# Patient Record
Sex: FEMALE | Race: WHITE | NOT HISPANIC OR LATINO | Employment: OTHER | URBAN - NONMETROPOLITAN AREA
[De-identification: names, ages, dates, MRNs, and addresses within clinical notes are randomized per-mention and may not be internally consistent; named-entity substitution may affect disease eponyms.]

---

## 2017-01-09 RX ORDER — PRAVASTATIN SODIUM 80 MG/1
TABLET ORAL
Qty: 90 TABLET | Refills: 0 | Status: SHIPPED | OUTPATIENT
Start: 2017-01-09 | End: 2017-03-09 | Stop reason: SDUPTHER

## 2017-03-09 RX ORDER — PRAVASTATIN SODIUM 80 MG/1
TABLET ORAL
Qty: 90 TABLET | Refills: 0 | Status: SHIPPED | OUTPATIENT
Start: 2017-03-09 | End: 2017-05-17 | Stop reason: SDUPTHER

## 2017-03-28 ENCOUNTER — APPOINTMENT (OUTPATIENT)
Dept: ULTRASOUND IMAGING | Facility: HOSPITAL | Age: 77
End: 2017-03-28

## 2017-03-28 ENCOUNTER — HOSPITAL ENCOUNTER (EMERGENCY)
Facility: HOSPITAL | Age: 77
Discharge: HOME | End: 2017-03-28
Attending: FAMILY MEDICINE | Admitting: FAMILY MEDICINE

## 2017-03-28 ENCOUNTER — APPOINTMENT (OUTPATIENT)
Dept: GENERAL RADIOLOGY | Facility: HOSPITAL | Age: 77
End: 2017-03-28

## 2017-03-28 VITALS
TEMPERATURE: 97.7 F | HEIGHT: 64 IN | OXYGEN SATURATION: 97 % | DIASTOLIC BLOOD PRESSURE: 72 MMHG | SYSTOLIC BLOOD PRESSURE: 153 MMHG | BODY MASS INDEX: 27.14 KG/M2 | WEIGHT: 159 LBS | HEART RATE: 58 BPM | RESPIRATION RATE: 20 BRPM

## 2017-03-28 DIAGNOSIS — S76.011A STRAIN OF MUSCLE OF RIGHT HIP, INITIAL ENCOUNTER: Primary | ICD-10-CM

## 2017-03-28 DIAGNOSIS — I10 BENIGN ESSENTIAL HYPERTENSION: ICD-10-CM

## 2017-03-28 LAB
ANION GAP SERPL CALCULATED.3IONS-SCNC: 9.4 MMOL/L
APTT PPP: 29.1 SECONDS (ref 25–36)
BASOPHILS # BLD AUTO: 0.05 10*3/MM3 (ref 0–0.2)
BASOPHILS NFR BLD AUTO: 0.8 % (ref 0–2.5)
BUN BLD-MCNC: 21 MG/DL (ref 7–20)
BUN/CREAT SERPL: 16.2 (ref 7.1–23.5)
CALCIUM SPEC-SCNC: 10.8 MG/DL (ref 8.4–10.2)
CHLORIDE SERPL-SCNC: 100 MMOL/L (ref 98–107)
CO2 SERPL-SCNC: 37 MMOL/L (ref 26–30)
CREAT BLD-MCNC: 1.3 MG/DL (ref 0.6–1.3)
DEPRECATED RDW RBC AUTO: 43.9 FL (ref 37–54)
EOSINOPHIL # BLD AUTO: 0.42 10*3/MM3 (ref 0–0.7)
EOSINOPHIL NFR BLD AUTO: 6.3 % (ref 0–7)
ERYTHROCYTE [DISTWIDTH] IN BLOOD BY AUTOMATED COUNT: 12.7 % (ref 11.5–14.5)
GFR SERPL CREATININE-BSD FRML MDRD: 40 ML/MIN/1.73
GLUCOSE BLD-MCNC: 90 MG/DL (ref 74–98)
HCT VFR BLD AUTO: 43.3 % (ref 37–47)
HGB BLD-MCNC: 14.6 G/DL (ref 12–16)
IMM GRANULOCYTES # BLD: 0.02 10*3/MM3 (ref 0–0.06)
IMM GRANULOCYTES NFR BLD: 0.3 % (ref 0–0.6)
INR PPP: 1.09 (ref 0.9–1.1)
LYMPHOCYTES # BLD AUTO: 3.16 10*3/MM3 (ref 0.6–3.4)
LYMPHOCYTES NFR BLD AUTO: 47.6 % (ref 10–50)
MCH RBC QN AUTO: 31.8 PG (ref 27–31)
MCHC RBC AUTO-ENTMCNC: 33.7 G/DL (ref 30–37)
MCV RBC AUTO: 94.3 FL (ref 81–99)
MONOCYTES # BLD AUTO: 0.58 10*3/MM3 (ref 0–0.9)
MONOCYTES NFR BLD AUTO: 8.7 % (ref 0–12)
NEUTROPHILS # BLD AUTO: 2.41 10*3/MM3 (ref 2–6.9)
NEUTROPHILS NFR BLD AUTO: 36.3 % (ref 37–80)
NRBC BLD MANUAL-RTO: 0 /100 WBC (ref 0–0)
PLATELET # BLD AUTO: 214 10*3/MM3 (ref 130–400)
PMV BLD AUTO: 9.3 FL (ref 6–12)
POTASSIUM BLD-SCNC: 3.4 MMOL/L (ref 3.5–5.1)
PROTHROMBIN TIME: 11.9 SECONDS (ref 9.3–12.1)
RBC # BLD AUTO: 4.59 10*6/MM3 (ref 4.2–5.4)
SODIUM BLD-SCNC: 143 MMOL/L (ref 137–145)
WBC NRBC COR # BLD: 6.64 10*3/MM3 (ref 4.8–10.8)

## 2017-03-28 PROCEDURE — 93971 EXTREMITY STUDY: CPT

## 2017-03-28 PROCEDURE — 93926 LOWER EXTREMITY STUDY: CPT

## 2017-03-28 PROCEDURE — 72170 X-RAY EXAM OF PELVIS: CPT

## 2017-03-28 PROCEDURE — 73562 X-RAY EXAM OF KNEE 3: CPT

## 2017-03-28 PROCEDURE — 85025 COMPLETE CBC W/AUTO DIFF WBC: CPT | Performed by: FAMILY MEDICINE

## 2017-03-28 PROCEDURE — 85610 PROTHROMBIN TIME: CPT | Performed by: FAMILY MEDICINE

## 2017-03-28 PROCEDURE — 99283 EMERGENCY DEPT VISIT LOW MDM: CPT

## 2017-03-28 PROCEDURE — 80048 BASIC METABOLIC PNL TOTAL CA: CPT | Performed by: FAMILY MEDICINE

## 2017-03-28 PROCEDURE — 85730 THROMBOPLASTIN TIME PARTIAL: CPT | Performed by: FAMILY MEDICINE

## 2017-03-28 RX ORDER — SODIUM CHLORIDE 0.9 % (FLUSH) 0.9 %
10 SYRINGE (ML) INJECTION AS NEEDED
Status: DISCONTINUED | OUTPATIENT
Start: 2017-03-28 | End: 2017-03-29 | Stop reason: HOSPADM

## 2017-03-28 RX ORDER — TRAMADOL HYDROCHLORIDE 50 MG/1
50 TABLET ORAL 2 TIMES DAILY PRN
Qty: 6 TABLET | Refills: 0 | Status: SHIPPED | OUTPATIENT
Start: 2017-03-28 | End: 2017-12-01

## 2017-03-29 LAB — HOLD SPECIMEN: NORMAL

## 2017-03-31 ENCOUNTER — OFFICE VISIT (OUTPATIENT)
Dept: INTERNAL MEDICINE | Facility: CLINIC | Age: 77
End: 2017-03-31

## 2017-03-31 VITALS
HEART RATE: 67 BPM | BODY MASS INDEX: 27.49 KG/M2 | HEIGHT: 64 IN | SYSTOLIC BLOOD PRESSURE: 128 MMHG | OXYGEN SATURATION: 96 % | WEIGHT: 161 LBS | DIASTOLIC BLOOD PRESSURE: 62 MMHG | TEMPERATURE: 98 F

## 2017-03-31 DIAGNOSIS — E03.8 OTHER SPECIFIED HYPOTHYROIDISM: ICD-10-CM

## 2017-03-31 DIAGNOSIS — E78.5 HYPERLIPIDEMIA, UNSPECIFIED HYPERLIPIDEMIA TYPE: ICD-10-CM

## 2017-03-31 DIAGNOSIS — K21.9 GASTROESOPHAGEAL REFLUX DISEASE WITHOUT ESOPHAGITIS: ICD-10-CM

## 2017-03-31 DIAGNOSIS — I10 BENIGN ESSENTIAL HYPERTENSION: Primary | ICD-10-CM

## 2017-03-31 PROCEDURE — 99213 OFFICE O/P EST LOW 20 MIN: CPT | Performed by: INTERNAL MEDICINE

## 2017-03-31 RX ORDER — LOSARTAN POTASSIUM 50 MG/1
50 TABLET ORAL DAILY
Qty: 90 TABLET | Refills: 1 | Status: SHIPPED | OUTPATIENT
Start: 2017-03-31 | End: 2017-10-10 | Stop reason: SDUPTHER

## 2017-03-31 RX ORDER — HYDROCHLOROTHIAZIDE 12.5 MG/1
CAPSULE, GELATIN COATED ORAL
Qty: 180 CAPSULE | Refills: 1 | Status: SHIPPED | OUTPATIENT
Start: 2017-03-31 | End: 2017-10-10 | Stop reason: SDUPTHER

## 2017-03-31 RX ORDER — LEVOTHYROXINE SODIUM 0.1 MG/1
100 TABLET ORAL DAILY
Qty: 90 TABLET | Refills: 1 | Status: SHIPPED | OUTPATIENT
Start: 2017-03-31 | End: 2017-05-02 | Stop reason: SDUPTHER

## 2017-03-31 NOTE — PROGRESS NOTES
"Chief Complaint   Patient presents with   • Follow-up     6 months for HTN, HLD, and GERD. Pt states her right leg has a strain muscule.      Subjective   Kimberley Coe is a 77 y.o. female.     HPI Comments: Here for follow up of HTN, HLD, hypothyroid, GERD, vitD def, allergies.  Denies CP, SOB, some occasional edema. Occasional SOB with exertion.   She has an inhaler but rarely uses this.   Does not eat a healthy diet, eats a lot of sweets and fried foods.  Allergies doing well overall with flonase.   GERD is well controlled.  Takes her thyroid med at night on empty stomach.   Takes pravachol in PM.   She was recently cleaning her flower bed and pulled her right thigh muscle.  It is starting to feel better.   She has been applying heat and ice alternating.        The following portions of the patient's history were reviewed and updated as appropriate: allergies, current medications, past family history, past medical history, past social history, past surgical history and problem list.    Review of Systems   Respiratory: Positive for shortness of breath.    Cardiovascular: Positive for leg swelling. Negative for chest pain and palpitations.   Gastrointestinal: Negative for constipation.   Endocrine: Negative for cold intolerance and heat intolerance.   Musculoskeletal: Positive for myalgias.   Allergic/Immunologic: Positive for environmental allergies.   All other systems reviewed and are negative.      Objective   /62  Pulse 67  Temp 98 °F (36.7 °C)  Ht 64\" (162.6 cm)  Wt 161 lb (73 kg)  SpO2 96%  BMI 27.64 kg/m2  Body mass index is 27.64 kg/(m^2).  Physical Exam   Constitutional: She is oriented to person, place, and time. She appears well-developed and well-nourished.   HENT:   Head: Normocephalic and atraumatic.   Mouth/Throat: Oropharynx is clear and moist.   Eyes: Conjunctivae and EOM are normal. Pupils are equal, round, and reactive to light.   Neck: Normal range of motion. Neck supple. No " thyromegaly present.   Cardiovascular: Normal rate, regular rhythm and intact distal pulses.    Systolic murmur grade 2/6 heard over precordium   Pulmonary/Chest: Effort normal and breath sounds normal. No respiratory distress.   Abdominal: Soft. Bowel sounds are normal.   Musculoskeletal: She exhibits no edema.   Pain over right medial thigh with palpation   Lymphadenopathy:     She has no cervical adenopathy.   Neurological: She is alert and oriented to person, place, and time. No cranial nerve deficit.   Psychiatric: She has a normal mood and affect. Judgment normal.   Nursing note and vitals reviewed.      Assessment/Plan   Kimberley Coe is here today and the following problems have been addressed:      Kimberley was seen today for follow-up.    Diagnoses and all orders for this visit:    Benign essential hypertension    Hyperlipidemia, unspecified hyperlipidemia type    Gastroesophageal reflux disease without esophagitis    Other specified hypothyroidism    Other orders  -     levothyroxine (SYNTHROID, LEVOTHROID) 100 MCG tablet; Take 1 tablet by mouth Daily.  -     losartan (COZAAR) 50 MG tablet; Take 1 tablet by mouth Daily.  -     hydrochlorothiazide (MICROZIDE) 12.5 MG capsule; Take 1-2 capsules daily for blood pressure    Follow heart healthy/low cholesterol diet  Avoid processed/fried foods/fast food  Exercise as tolerated up to 30 minutes 5 days a week  Take all medications as prescribed  Check BP on occasion  Recommend foods high in potassium once a day as discussed due to low K on labs in ER  Labs as noted in one month to followup abnormal ER labs     RTC 4 mo  Please note that portions of this note were completed with a voice recognition program.  Efforts were made to edit dictation, but occasionally words are mistranscribed.

## 2017-04-03 ENCOUNTER — TRANSCRIBE ORDERS (OUTPATIENT)
Dept: MAMMOGRAPHY | Facility: HOSPITAL | Age: 77
End: 2017-04-03

## 2017-04-03 DIAGNOSIS — Z13.9 SCREENING: Primary | ICD-10-CM

## 2017-04-17 ENCOUNTER — HOSPITAL ENCOUNTER (OUTPATIENT)
Dept: MAMMOGRAPHY | Facility: HOSPITAL | Age: 77
Discharge: HOME | End: 2017-04-17
Admitting: INTERNAL MEDICINE

## 2017-04-17 DIAGNOSIS — Z13.9 SCREENING: ICD-10-CM

## 2017-04-17 PROCEDURE — G0202 SCR MAMMO BI INCL CAD: HCPCS

## 2017-04-17 PROCEDURE — 77063 BREAST TOMOSYNTHESIS BI: CPT

## 2017-04-28 ENCOUNTER — RESULTS ENCOUNTER (OUTPATIENT)
Dept: INTERNAL MEDICINE | Facility: CLINIC | Age: 77
End: 2017-04-28

## 2017-04-28 DIAGNOSIS — I10 BENIGN ESSENTIAL HYPERTENSION: ICD-10-CM

## 2017-04-28 DIAGNOSIS — E03.8 OTHER SPECIFIED HYPOTHYROIDISM: ICD-10-CM

## 2017-04-28 DIAGNOSIS — E78.5 HYPERLIPIDEMIA, UNSPECIFIED HYPERLIPIDEMIA TYPE: ICD-10-CM

## 2017-04-28 LAB
ALBUMIN SERPL-MCNC: 3.8 G/DL (ref 3.5–5)
ALBUMIN/GLOB SERPL: 1.4 G/DL (ref 1–2)
ALP SERPL-CCNC: 46 U/L (ref 38–126)
ALT SERPL-CCNC: 27 U/L (ref 13–69)
AST SERPL-CCNC: 21 U/L (ref 15–46)
BASOPHILS # BLD AUTO: 0.06 10*3/MM3 (ref 0–0.2)
BASOPHILS NFR BLD AUTO: 1 % (ref 0–2.5)
BILIRUB SERPL-MCNC: 0.8 MG/DL (ref 0.2–1.3)
BUN SERPL-MCNC: 20 MG/DL (ref 7–20)
BUN/CREAT SERPL: 18.2 (ref 7.1–23.5)
CALCIUM SERPL-MCNC: 10.2 MG/DL (ref 8.4–10.2)
CHLORIDE SERPL-SCNC: 98 MMOL/L (ref 98–107)
CHOLEST SERPL-MCNC: 145 MG/DL (ref 0–199)
CO2 SERPL-SCNC: 30 MMOL/L (ref 26–30)
CREAT SERPL-MCNC: 1.1 MG/DL (ref 0.6–1.3)
EOSINOPHIL # BLD AUTO: 0.36 10*3/MM3 (ref 0–0.7)
EOSINOPHIL NFR BLD AUTO: 6.1 % (ref 0–7)
ERYTHROCYTE [DISTWIDTH] IN BLOOD BY AUTOMATED COUNT: 12.9 % (ref 11.5–14.5)
GLOBULIN SER CALC-MCNC: 2.8 GM/DL
GLUCOSE SERPL-MCNC: 84 MG/DL (ref 74–98)
HCT VFR BLD AUTO: 40.2 % (ref 37–47)
HDLC SERPL-MCNC: 55 MG/DL (ref 40–60)
HGB BLD-MCNC: 13.1 G/DL (ref 12–16)
IMM GRANULOCYTES # BLD: 0.02 10*3/MM3 (ref 0–0.06)
IMM GRANULOCYTES NFR BLD: 0.3 % (ref 0–0.6)
LDLC SERPL CALC-MCNC: 66 MG/DL (ref 0–99)
LYMPHOCYTES # BLD AUTO: 2.35 10*3/MM3 (ref 0.6–3.4)
LYMPHOCYTES NFR BLD AUTO: 39.7 % (ref 10–50)
MCH RBC QN AUTO: 31.6 PG (ref 27–31)
MCHC RBC AUTO-ENTMCNC: 32.6 G/DL (ref 30–37)
MCV RBC AUTO: 96.9 FL (ref 81–99)
MONOCYTES # BLD AUTO: 0.68 10*3/MM3 (ref 0–0.9)
MONOCYTES NFR BLD AUTO: 11.5 % (ref 0–12)
NEUTROPHILS # BLD AUTO: 2.45 10*3/MM3 (ref 2–6.9)
NEUTROPHILS NFR BLD AUTO: 41.4 % (ref 37–80)
NRBC BLD AUTO-RTO: 0 /100 WBC (ref 0–0)
PLATELET # BLD AUTO: 235 10*3/MM3 (ref 130–400)
POTASSIUM SERPL-SCNC: 4.1 MMOL/L (ref 3.5–5.1)
PROT SERPL-MCNC: 6.6 G/DL (ref 6.3–8.2)
RBC # BLD AUTO: 4.15 10*6/MM3 (ref 4.2–5.4)
SODIUM SERPL-SCNC: 139 MMOL/L (ref 137–145)
T4 FREE SERPL-MCNC: 1.6 NG/DL (ref 0.78–2.19)
TRIGL SERPL-MCNC: 122 MG/DL
TSH SERPL DL<=0.005 MIU/L-ACNC: 0.98 MIU/ML (ref 0.47–4.68)
VLDLC SERPL CALC-MCNC: 24.4 MG/DL
WBC # BLD AUTO: 5.92 10*3/MM3 (ref 4.8–10.8)

## 2017-05-02 ENCOUNTER — TELEPHONE (OUTPATIENT)
Dept: INTERNAL MEDICINE | Facility: CLINIC | Age: 77
End: 2017-05-02

## 2017-05-02 RX ORDER — LEVOTHYROXINE SODIUM 0.1 MG/1
100 TABLET ORAL DAILY
Qty: 90 TABLET | Refills: 3 | Status: SHIPPED | OUTPATIENT
Start: 2017-05-02 | End: 2018-06-11 | Stop reason: SDUPTHER

## 2017-05-17 RX ORDER — PRAVASTATIN SODIUM 80 MG/1
TABLET ORAL
Qty: 90 TABLET | Refills: 1 | Status: SHIPPED | OUTPATIENT
Start: 2017-05-17 | End: 2017-12-13 | Stop reason: SDUPTHER

## 2017-07-13 ENCOUNTER — TELEPHONE (OUTPATIENT)
Dept: INTERNAL MEDICINE | Facility: CLINIC | Age: 77
End: 2017-07-13

## 2017-07-13 DIAGNOSIS — Z01.00 ROUTINE EYE EXAM: Primary | ICD-10-CM

## 2017-07-13 NOTE — TELEPHONE ENCOUNTER
Patient needs a referral to Opthalmology.    She said her appt is with Dr. Rodrigo Dumont  Fax: [phone]  Phone:[phone]    She'd like a callback.

## 2017-08-01 ENCOUNTER — OFFICE VISIT (OUTPATIENT)
Dept: INTERNAL MEDICINE | Facility: CLINIC | Age: 77
End: 2017-08-01

## 2017-08-01 VITALS
WEIGHT: 157 LBS | TEMPERATURE: 97.5 F | DIASTOLIC BLOOD PRESSURE: 64 MMHG | OXYGEN SATURATION: 95 % | BODY MASS INDEX: 26.8 KG/M2 | HEIGHT: 64 IN | SYSTOLIC BLOOD PRESSURE: 126 MMHG | HEART RATE: 56 BPM

## 2017-08-01 DIAGNOSIS — K21.9 GASTROESOPHAGEAL REFLUX DISEASE WITHOUT ESOPHAGITIS: ICD-10-CM

## 2017-08-01 DIAGNOSIS — E83.52 HYPERCALCEMIA: ICD-10-CM

## 2017-08-01 DIAGNOSIS — I10 BENIGN ESSENTIAL HYPERTENSION: Primary | ICD-10-CM

## 2017-08-01 DIAGNOSIS — E78.2 MIXED HYPERLIPIDEMIA: ICD-10-CM

## 2017-08-01 PROCEDURE — 99213 OFFICE O/P EST LOW 20 MIN: CPT | Performed by: INTERNAL MEDICINE

## 2017-08-01 RX ORDER — ALBUTEROL SULFATE 90 UG/1
2 AEROSOL, METERED RESPIRATORY (INHALATION) EVERY 4 HOURS PRN
Qty: 18 G | Refills: 3 | Status: SHIPPED | OUTPATIENT
Start: 2017-08-01 | End: 2018-08-14 | Stop reason: SDUPTHER

## 2017-08-01 NOTE — PROGRESS NOTES
"Chief Complaint   Patient presents with   • Follow-up     4 months for HTN, HLD, and Hypothyroidism. No new complaints.      Subjective   Kimberley Coe is a 77 y.o. female.     HPI Comments: Here for follow up of HTN, HLD, hypothyroid, GERD, vitD def, allergies.  Denies CP, SOB, some occasional edema. Occasional SOB with exertion or with excessive heat. .   She has an inhaler but rarely uses this.  She may use this 1-2 times a week.   Does not eat a healthy diet, eats a lot of sweets and fried foods.  Has tried to eat more vegetables thru the summer.   Allergies doing well overall with flonase.   GERD is well controlled with use of omeprazole.  Takes her thyroid med at night on empty stomach.   Takes pravachol in PM.   She is not taking tramadol currently.    She is compliant with vitamin B12 and D.        The following portions of the patient's history were reviewed and updated as appropriate: allergies, current medications, past family history, past medical history, past social history, past surgical history and problem list.    Review of Systems   Respiratory: Positive for shortness of breath.    Cardiovascular: Negative for chest pain, palpitations and leg swelling.   Gastrointestinal: Negative.    Endocrine: Negative for cold intolerance and heat intolerance.   Allergic/Immunologic: Negative for environmental allergies.   All other systems reviewed and are negative.      Objective   /64  Pulse 56  Temp 97.5 °F (36.4 °C)  Ht 64\" (162.6 cm)  Wt 157 lb (71.2 kg)  SpO2 95%  BMI 26.95 kg/m2  Body mass index is 26.95 kg/(m^2).  Physical Exam   Constitutional: She is oriented to person, place, and time. She appears well-developed and well-nourished.   HENT:   Head: Normocephalic and atraumatic.   Mouth/Throat: Oropharynx is clear and moist.   Eyes: Conjunctivae and EOM are normal. Pupils are equal, round, and reactive to light.   Neck: Normal range of motion. Neck supple. No thyromegaly present. "   Cardiovascular: Normal rate, regular rhythm and intact distal pulses.    Murmur heard.  SM 2/6 at USB   Pulmonary/Chest: Effort normal and breath sounds normal. No respiratory distress.   Abdominal: Soft. Bowel sounds are normal.   Musculoskeletal: She exhibits no edema.   Lymphadenopathy:     She has no cervical adenopathy.   Neurological: She is alert and oriented to person, place, and time. No cranial nerve deficit.   Psychiatric: She has a normal mood and affect. Judgment normal.   Nursing note and vitals reviewed.      Assessment/Plan   Kimberley Coe is here today and the following problems have been addressed:      Kimberley was seen today for follow-up.    Diagnoses and all orders for this visit:    Benign essential hypertension    Hypercalcemia    Mixed hyperlipidemia    Gastroesophageal reflux disease without esophagitis    Other orders  -     albuterol (PROVENTIL HFA;VENTOLIN HFA) 108 (90 BASE) MCG/ACT inhaler; Inhale 2 puffs Every 4 (Four) Hours As Needed for Wheezing.    Follow heart healthy/low salt diet  Avoid processed foods  Monitor blood pressure as discussed  Exercise as tolerated up to 30 minutes 5 days per week  Take all medications as prescribed  Encouraged more walking and better diet for cholesterol  RF given on albuterol  Labs are UTD    RTC 4 mo    Please note that portions of this note were completed with a voice recognition program.  Efforts were made to edit dictation, but occasionally words are mistranscribed.

## 2017-10-02 ENCOUNTER — PREP FOR SURGERY (OUTPATIENT)
Dept: OTHER | Facility: HOSPITAL | Age: 77
End: 2017-10-02

## 2017-10-02 DIAGNOSIS — H26.9 CATARACT, LEFT: Primary | ICD-10-CM

## 2017-10-02 RX ORDER — PHENYLEPHRINE HYDROCHLORIDE 100 MG/ML
1 SOLUTION/ DROPS OPHTHALMIC
Status: CANCELLED | OUTPATIENT
Start: 2017-10-02 | End: 2017-10-02

## 2017-10-02 RX ORDER — LIDOCAINE HYDROCHLORIDE 40 MG/ML
2 INJECTION, SOLUTION RETROBULBAR; TOPICAL
Status: CANCELLED | OUTPATIENT
Start: 2017-10-02

## 2017-10-02 RX ORDER — CYCLOPENTOLATE HYDROCHLORIDE 20 MG/ML
1 SOLUTION/ DROPS OPHTHALMIC
Status: CANCELLED | OUTPATIENT
Start: 2017-10-02 | End: 2017-10-02

## 2017-10-02 RX ORDER — TETRACAINE HYDROCHLORIDE 5 MG/ML
2 SOLUTION OPHTHALMIC AS NEEDED
Status: CANCELLED | OUTPATIENT
Start: 2017-10-02

## 2017-10-02 RX ORDER — MOXIFLOXACIN 5 MG/ML
1 SOLUTION/ DROPS OPHTHALMIC
Status: CANCELLED | OUTPATIENT
Start: 2017-10-02 | End: 2017-10-02

## 2017-10-02 RX ORDER — PREDNISOLONE ACETATE 10 MG/ML
1 SUSPENSION/ DROPS OPHTHALMIC 4 TIMES DAILY
Status: CANCELLED | OUTPATIENT
Start: 2017-10-02

## 2017-10-05 ENCOUNTER — ANESTHESIA EVENT (OUTPATIENT)
Dept: PERIOP | Facility: HOSPITAL | Age: 77
End: 2017-10-05

## 2017-10-05 ENCOUNTER — HOSPITAL ENCOUNTER (OUTPATIENT)
Facility: HOSPITAL | Age: 77
Setting detail: HOSPITAL OUTPATIENT SURGERY
Discharge: HOME | End: 2017-10-05
Attending: OPHTHALMOLOGY | Admitting: OPHTHALMOLOGY

## 2017-10-05 ENCOUNTER — ANESTHESIA (OUTPATIENT)
Dept: PERIOP | Facility: HOSPITAL | Age: 77
End: 2017-10-05

## 2017-10-05 VITALS
BODY MASS INDEX: 27.82 KG/M2 | RESPIRATION RATE: 18 BRPM | DIASTOLIC BLOOD PRESSURE: 56 MMHG | HEIGHT: 63 IN | OXYGEN SATURATION: 99 % | WEIGHT: 157 LBS | SYSTOLIC BLOOD PRESSURE: 165 MMHG | TEMPERATURE: 97.7 F | HEART RATE: 63 BPM

## 2017-10-05 DIAGNOSIS — H26.9 CATARACT, LEFT: ICD-10-CM

## 2017-10-05 PROCEDURE — V2632 POST CHMBR INTRAOCULAR LENS: HCPCS | Performed by: OPHTHALMOLOGY

## 2017-10-05 PROCEDURE — 25010000002 EPINEPHRINE 1 MG/ML SOLUTION: Performed by: OPHTHALMOLOGY

## 2017-10-05 PROCEDURE — 25010000002 MIDAZOLAM PER 1 MG: Performed by: NURSE ANESTHETIST, CERTIFIED REGISTERED

## 2017-10-05 PROCEDURE — 25010000002 EPINEPHRINE PER 0.1 MG: Performed by: OPHTHALMOLOGY

## 2017-10-05 DEVICE — LENS ACRYSOF IQ 6X13MM SN60WF 21.5: Type: IMPLANTABLE DEVICE | Site: POSTERIOR CHAMBER | Status: FUNCTIONAL

## 2017-10-05 RX ORDER — POLYMYXIN B SULFATE AND TRIMETHOPRIM 1; 10000 MG/ML; [USP'U]/ML
SOLUTION OPHTHALMIC AS NEEDED
Status: DISCONTINUED | OUTPATIENT
Start: 2017-10-05 | End: 2017-10-05 | Stop reason: HOSPADM

## 2017-10-05 RX ORDER — BALANCED SALT SOLUTION 6.4; .75; .48; .3; 3.9; 1.7 MG/ML; MG/ML; MG/ML; MG/ML; MG/ML; MG/ML
SOLUTION OPHTHALMIC AS NEEDED
Status: DISCONTINUED | OUTPATIENT
Start: 2017-10-05 | End: 2017-10-05 | Stop reason: HOSPADM

## 2017-10-05 RX ORDER — PREDNISOLONE ACETATE 10 MG/ML
SUSPENSION/ DROPS OPHTHALMIC
Status: DISCONTINUED
Start: 2017-10-05 | End: 2017-10-05 | Stop reason: HOSPADM

## 2017-10-05 RX ORDER — PHENYLEPHRINE HYDROCHLORIDE 100 MG/ML
1 SOLUTION/ DROPS OPHTHALMIC
Status: COMPLETED | OUTPATIENT
Start: 2017-10-05 | End: 2017-10-05

## 2017-10-05 RX ORDER — EPINEPHRINE 1 MG/ML
INJECTION INTRAMUSCULAR; INTRAVENOUS; SUBCUTANEOUS AS NEEDED
Status: DISCONTINUED | OUTPATIENT
Start: 2017-10-05 | End: 2017-10-05 | Stop reason: HOSPADM

## 2017-10-05 RX ORDER — SODIUM CHLORIDE, SODIUM LACTATE, POTASSIUM CHLORIDE, CALCIUM CHLORIDE 600; 310; 30; 20 MG/100ML; MG/100ML; MG/100ML; MG/100ML
1000 INJECTION, SOLUTION INTRAVENOUS CONTINUOUS PRN
Status: DISCONTINUED | OUTPATIENT
Start: 2017-10-05 | End: 2017-10-05 | Stop reason: HOSPADM

## 2017-10-05 RX ORDER — CYCLOPENTOLATE HYDROCHLORIDE 20 MG/ML
1 SOLUTION/ DROPS OPHTHALMIC
Status: COMPLETED | OUTPATIENT
Start: 2017-10-05 | End: 2017-10-05

## 2017-10-05 RX ORDER — MIDAZOLAM HYDROCHLORIDE 1 MG/ML
INJECTION INTRAMUSCULAR; INTRAVENOUS AS NEEDED
Status: DISCONTINUED | OUTPATIENT
Start: 2017-10-05 | End: 2017-10-05 | Stop reason: SURG

## 2017-10-05 RX ORDER — PHENYLEPHRINE HYDROCHLORIDE 100 MG/ML
SOLUTION/ DROPS OPHTHALMIC
Status: COMPLETED
Start: 2017-10-05 | End: 2017-10-05

## 2017-10-05 RX ORDER — PREDNISOLONE ACETATE 10 MG/ML
1 SUSPENSION/ DROPS OPHTHALMIC 4 TIMES DAILY
Status: DISCONTINUED | OUTPATIENT
Start: 2017-10-05 | End: 2017-10-05 | Stop reason: HOSPADM

## 2017-10-05 RX ORDER — SODIUM CHLORIDE 0.9 % (FLUSH) 0.9 %
3 SYRINGE (ML) INJECTION AS NEEDED
Status: DISCONTINUED | OUTPATIENT
Start: 2017-10-05 | End: 2017-10-05 | Stop reason: HOSPADM

## 2017-10-05 RX ORDER — MOXIFLOXACIN 5 MG/ML
1 SOLUTION/ DROPS OPHTHALMIC
Status: ACTIVE | OUTPATIENT
Start: 2017-10-05 | End: 2017-10-05

## 2017-10-05 RX ORDER — TETRACAINE HYDROCHLORIDE 5 MG/ML
SOLUTION OPHTHALMIC AS NEEDED
Status: DISCONTINUED | OUTPATIENT
Start: 2017-10-05 | End: 2017-10-05 | Stop reason: HOSPADM

## 2017-10-05 RX ORDER — LIDOCAINE HYDROCHLORIDE 40 MG/ML
SOLUTION TOPICAL AS NEEDED
Status: DISCONTINUED | OUTPATIENT
Start: 2017-10-05 | End: 2017-10-05 | Stop reason: HOSPADM

## 2017-10-05 RX ORDER — CYCLOPENTOLATE HYDROCHLORIDE 20 MG/ML
SOLUTION/ DROPS OPHTHALMIC
Status: COMPLETED
Start: 2017-10-05 | End: 2017-10-05

## 2017-10-05 RX ADMIN — MIDAZOLAM HYDROCHLORIDE 0.5 MG: 1 INJECTION, SOLUTION INTRAMUSCULAR; INTRAVENOUS at 09:46

## 2017-10-05 RX ADMIN — SODIUM CHLORIDE, POTASSIUM CHLORIDE, SODIUM LACTATE AND CALCIUM CHLORIDE 1000 ML: 600; 310; 30; 20 INJECTION, SOLUTION INTRAVENOUS at 08:11

## 2017-10-05 RX ADMIN — PHENYLEPHRINE HYDROCHLORIDE 1 DROP: 100 SOLUTION/ DROPS OPHTHALMIC at 08:12

## 2017-10-05 RX ADMIN — PHENYLEPHRINE HYDROCHLORIDE 1 DROP: 100 SOLUTION/ DROPS OPHTHALMIC at 08:02

## 2017-10-05 RX ADMIN — CYCLOPENTOLATE HYDROCHLORIDE 1 DROP: 20 SOLUTION/ DROPS OPHTHALMIC at 08:07

## 2017-10-05 RX ADMIN — CYCLOPENTOLATE HYDROCHLORIDE 1 DROP: 20 SOLUTION/ DROPS OPHTHALMIC at 08:02

## 2017-10-05 RX ADMIN — CYCLOPENTOLATE HYDROCHLORIDE 1 DROP: 20 SOLUTION/ DROPS OPHTHALMIC at 08:12

## 2017-10-05 RX ADMIN — MIDAZOLAM HYDROCHLORIDE 0.5 MG: 1 INJECTION, SOLUTION INTRAMUSCULAR; INTRAVENOUS at 09:41

## 2017-10-05 RX ADMIN — PHENYLEPHRINE HYDROCHLORIDE 1 DROP: 100 SOLUTION/ DROPS OPHTHALMIC at 08:07

## 2017-10-05 NOTE — H&P
[id number]  Teena Coe  female  1940  Rodrigo Dumont MD  10/5/2017  PATIENT NAME: Teena Coe    [facility]  PREOPERATIVE PHYSICAL EXAMINATION    Temp:  [97.6 °F (36.4 °C)] 97.6 °F (36.4 °C)  Heart Rate:  [69] 69  Resp:  [18] 18  BP: (182)/(78) 182/78    Past Medical History:   Diagnosis Date   • Arthritis    • Cataracts, bilateral    • Disease of thyroid gland    • Emphysema of lung    • Full dentures    • GERD (gastroesophageal reflux disease)    • Heart murmur    • Hyperlipidemia    • Hypertension    • Wears glasses        Past Surgical History:   Procedure Laterality Date   • COLONOSCOPY     • MULTIPLE TOOTH EXTRACTIONS         Social History     Social History   • Marital status:      Spouse name: N/A   • Number of children: N/A   • Years of education: N/A     Occupational History   • Not on file.     Social History Main Topics   • Smoking status: Former Smoker   • Smokeless tobacco: Never Used      Comment: QUIT 30 YEARS AGO   • Alcohol use No   • Drug use: No   • Sexual activity: Defer     Other Topics Concern   • Not on file     Social History Narrative       Family History   Problem Relation Age of Onset   • Diabetes Brother    • Breast cancer Daughter        Prescriptions Prior to Admission   Medication Sig Dispense Refill Last Dose   • albuterol (PROVENTIL HFA;VENTOLIN HFA) 108 (90 BASE) MCG/ACT inhaler Inhale 2 puffs Every 4 (Four) Hours As Needed for Wheezing. 18 g 3 9/21/2017   • aspirin 81 MG tablet Take  by mouth Daily.   10/4/2017 at 2000   • Cholecalciferol (VITAMIN D PO) Take  by mouth Daily.   10/4/2017 at 0800   • Cyanocobalamin (VITAMIN B-12 CR PO) Take  by mouth Daily.   10/4/2017 at 0800   • fluticasone (FLONASE) 50 MCG/ACT nasal spray into each nostril Daily As Needed.   10/4/2017 at 2100   • hydrochlorothiazide (MICROZIDE) 12.5 MG capsule Take 1-2 capsules daily for blood pressure 180 capsule 1 10/4/2017 at 0800   • levothyroxine (SYNTHROID, LEVOTHROID) 100 MCG tablet  Take 1 tablet by mouth Daily. 90 tablet 3 10/4/2017 at 2000   • losartan (COZAAR) 50 MG tablet Take 1 tablet by mouth Daily. 90 tablet 1 10/5/2017 at 0600   • Multiple Vitamins-Minerals (CENTRUM ADULTS PO) Take  by mouth Daily.   10/4/2017 at 0800   • omeprazole (PriLOSEC) 20 MG capsule Take 20 mg by mouth Daily.   10/4/2017 at 0800   • pravastatin (PRAVACHOL) 80 MG tablet TAKE 1 TABLET EVERY DAY 90 tablet 1 10/4/2017 at 2000   • traMADol (ULTRAM) 50 MG tablet Take 1 tablet by mouth 2 (Two) Times a Day As Needed for Moderate Pain (4-6) or Severe Pain (7-10). 6 tablet 0 10/4/2017 at 2000        Within Normal Limits Abnormal   Mental Status [x]    []     Head, Neck, and Throat [x]   []     Chest/Lungs [x]   []     Cardiovascular [x]   []     Abdomen [x]   []     Extremities [x]   []     Neurologic [x]   []     Other       Diagnosis: Cataract      STATEMENT AS TO REASON OF PLANNED OPERATION:  [x]   H&P revealed no contraindication to planned surgery. (Check if correct).    [x]   Labs (if ordered) have been reviewed and revealed no contraindications to planned surgery. (Check if correct).    [x]   Patient has been advised to see her local medical doctor/family doctor for follow-up regarding systemic care and/or abnormal labs.  (Check if correct).    Rodrigo Dumont MD  10/5/2017

## 2017-10-05 NOTE — OP NOTE
"Patient Name: Teena Coe  Patient Number: [id number]    PRE-OPERATIVE DIAGNOSIS:  Cataract []  OD, [x]  OS  H25.1()    POST-OPERATIVE DIAGNOSIS:  Same    PROCEDURE:     CATARACT PHACO EXTRACTION WITH INTRAOCULAR LENS IMPLANT LEFT (Left)    Surgeon:      Rodrigo Dumont MD    Date of Operation:    10/5/2017    ANESTHESIA:   MAC  COMPLICATIONS:    None  SPECIMEN REMOVED:  Cataract  ESTIMATED BLOOD LOSS:  None    After identifying the patient and obtained fully-informed consent, preoperative drops placed as ordered.  Pre-operative \"time out\" performed.  Patient brought into the operating room and positioned.  Cardiac monitoring was instituted.  Anesthetic gtt to operative eye.      After a surgical scrub, the patient was prepped and draped in the standard ophthalmic fashion.  Lid speculum. Paracentesis. Viscoelastic. Keratome tunneled into anterior chamber.  Capsulorrhexis. Hydrodissection.    Phaco machine tested and found to be in good working order.  Two-handed phacoemulsification performed.     I/A to remove residual cortex.  Viscoelastic in capsular bag.  Intraocular lens placed in standard fashion and centered.  I/A to remove viscoelastic.  Wound hydrated, tested, and found to be watertight.      Lid speculum and drapes removed.  Antibiotic gtt. Eye shield.  Patient to recovery area.  Verbal and written discharge instructions given.  Follow-up appointment given.    Rodrigo Dumont MD      Immediate Post-Op Note  Date: 10/5/2017 10:01 AM      "

## 2017-10-05 NOTE — ANESTHESIA PREPROCEDURE EVALUATION
Anesthesia Evaluation     Patient summary reviewed and Nursing notes reviewed   no history of anesthetic complications:  NPO Solid Status: > 8 hours  NPO Liquid Status: > 8 hours     Airway   no difficulty expected  Dental      Pulmonary - normal exam   (+) a smoker Former, COPD,   Cardiovascular - normal exam    PT is on anticoagulation therapy  Patient on routine beta blocker and Beta blocker given within 24 hours of surgery    (+) hypertension, valvular problems/murmurs, CAD, PVD, hyperlipidemia      Neuro/Psych  GI/Hepatic/Renal/Endo    (+)  GERD, hypothyroidism,     Musculoskeletal     (+) arthralgias,   Abdominal  - normal exam   Substance History      OB/GYN          Other   (+) arthritis                                   Anesthesia Plan    ASA 3     MAC     Anesthetic plan and risks discussed with patient.

## 2017-10-05 NOTE — ANESTHESIA POSTPROCEDURE EVALUATION
Patient: Teena Coe    Procedure Summary     Date Anesthesia Start Anesthesia Stop Room / Location    10/05/17 0940   [facility]       Procedure Diagnosis Surgeon Provider    CATARACT PHACO EXTRACTION WITH INTRAOCULAR LENS IMPLANT LEFT (Left Eye) No diagnosis on file. MD Rebel Sherman CRNA          Anesthesia Type: MAC  Last vitals  BP       Temp        Pulse       Resp        SpO2          Post Anesthesia Care and Evaluation    Patient location during evaluation: PHASE II  Patient participation: complete - patient participated  Level of consciousness: awake  Pain score: 0  Pain management: adequate  Airway patency: patent  Anesthetic complications: No anesthetic complications  PONV Status: none  Cardiovascular status: acceptable  Respiratory status: acceptable  Hydration status: acceptable    Comments: vsss resp spont, reflexes intact, responsive, report given to pacu nurse

## 2017-10-05 NOTE — PLAN OF CARE
Problem: Cataract (Adult)  Goal: Signs and Symptoms of Listed Potential Problems Will be Absent or Manageable (Cataract)  Outcome: Ongoing (interventions implemented as appropriate)    10/05/17 0750   Cataract   Problems Assessed (Cataract) all   Problems Present (Cataract) visual disturbance

## 2017-10-11 RX ORDER — HYDROCHLOROTHIAZIDE 12.5 MG/1
CAPSULE, GELATIN COATED ORAL
Qty: 180 CAPSULE | Refills: 1 | Status: SHIPPED | OUTPATIENT
Start: 2017-10-11 | End: 2018-04-10 | Stop reason: SDUPTHER

## 2017-10-11 RX ORDER — LOSARTAN POTASSIUM 50 MG/1
TABLET ORAL
Qty: 90 TABLET | Refills: 1 | Status: SHIPPED | OUTPATIENT
Start: 2017-10-11 | End: 2018-04-10 | Stop reason: SDUPTHER

## 2017-10-16 ENCOUNTER — PREP FOR SURGERY (OUTPATIENT)
Dept: OTHER | Facility: HOSPITAL | Age: 77
End: 2017-10-16

## 2017-10-16 DIAGNOSIS — H26.9 CATARACT, RIGHT: Primary | ICD-10-CM

## 2017-10-16 RX ORDER — MOXIFLOXACIN 5 MG/ML
1 SOLUTION/ DROPS OPHTHALMIC
Status: CANCELLED | OUTPATIENT
Start: 2017-10-16 | End: 2017-10-16

## 2017-10-16 RX ORDER — TETRACAINE HYDROCHLORIDE 5 MG/ML
2 SOLUTION OPHTHALMIC AS NEEDED
Status: CANCELLED | OUTPATIENT
Start: 2017-10-16

## 2017-10-16 RX ORDER — PREDNISOLONE ACETATE 10 MG/ML
1 SUSPENSION/ DROPS OPHTHALMIC 4 TIMES DAILY
Status: CANCELLED | OUTPATIENT
Start: 2017-10-16

## 2017-10-16 RX ORDER — LIDOCAINE HYDROCHLORIDE 40 MG/ML
2 INJECTION, SOLUTION RETROBULBAR; TOPICAL
Status: CANCELLED | OUTPATIENT
Start: 2017-10-16

## 2017-10-16 RX ORDER — PHENYLEPHRINE HYDROCHLORIDE 100 MG/ML
1 SOLUTION/ DROPS OPHTHALMIC
Status: CANCELLED | OUTPATIENT
Start: 2017-10-16 | End: 2017-10-16

## 2017-10-16 RX ORDER — CYCLOPENTOLATE HYDROCHLORIDE 20 MG/ML
1 SOLUTION/ DROPS OPHTHALMIC
Status: CANCELLED | OUTPATIENT
Start: 2017-10-16 | End: 2017-10-16

## 2017-10-19 ENCOUNTER — HOSPITAL ENCOUNTER (OUTPATIENT)
Facility: HOSPITAL | Age: 77
Setting detail: HOSPITAL OUTPATIENT SURGERY
Discharge: HOME | End: 2017-10-19
Attending: OPHTHALMOLOGY | Admitting: OPHTHALMOLOGY

## 2017-10-19 ENCOUNTER — ANESTHESIA EVENT (OUTPATIENT)
Dept: PERIOP | Facility: HOSPITAL | Age: 77
End: 2017-10-19

## 2017-10-19 ENCOUNTER — ANESTHESIA (OUTPATIENT)
Dept: PERIOP | Facility: HOSPITAL | Age: 77
End: 2017-10-19

## 2017-10-19 VITALS
HEART RATE: 63 BPM | HEIGHT: 63 IN | DIASTOLIC BLOOD PRESSURE: 65 MMHG | SYSTOLIC BLOOD PRESSURE: 145 MMHG | OXYGEN SATURATION: 97 % | RESPIRATION RATE: 18 BRPM | TEMPERATURE: 98.6 F | WEIGHT: 156 LBS | BODY MASS INDEX: 27.64 KG/M2

## 2017-10-19 DIAGNOSIS — H26.9 CATARACT, RIGHT: ICD-10-CM

## 2017-10-19 PROCEDURE — 25010000002 EPINEPHRINE 1 MG/ML SOLUTION: Performed by: OPHTHALMOLOGY

## 2017-10-19 PROCEDURE — V2632 POST CHMBR INTRAOCULAR LENS: HCPCS | Performed by: OPHTHALMOLOGY

## 2017-10-19 PROCEDURE — 25010000002 EPINEPHRINE PER 0.1 MG: Performed by: OPHTHALMOLOGY

## 2017-10-19 PROCEDURE — 25010000002 MIDAZOLAM PER 1 MG: Performed by: NURSE ANESTHETIST, CERTIFIED REGISTERED

## 2017-10-19 DEVICE — LENS ACRYSOF IQ 6X13MM SN60WF 22.0: Type: IMPLANTABLE DEVICE | Site: POSTERIOR CHAMBER | Status: FUNCTIONAL

## 2017-10-19 RX ORDER — PHENYLEPHRINE HYDROCHLORIDE 100 MG/ML
SOLUTION/ DROPS OPHTHALMIC
Status: COMPLETED
Start: 2017-10-19 | End: 2017-10-19

## 2017-10-19 RX ORDER — LIDOCAINE HYDROCHLORIDE 40 MG/ML
SOLUTION TOPICAL AS NEEDED
Status: DISCONTINUED | OUTPATIENT
Start: 2017-10-19 | End: 2017-10-19 | Stop reason: HOSPADM

## 2017-10-19 RX ORDER — MIDAZOLAM HYDROCHLORIDE 1 MG/ML
INJECTION INTRAMUSCULAR; INTRAVENOUS AS NEEDED
Status: DISCONTINUED | OUTPATIENT
Start: 2017-10-19 | End: 2017-10-19 | Stop reason: SURG

## 2017-10-19 RX ORDER — SODIUM CHLORIDE 0.9 % (FLUSH) 0.9 %
3 SYRINGE (ML) INJECTION AS NEEDED
Status: DISCONTINUED | OUTPATIENT
Start: 2017-10-19 | End: 2017-10-19 | Stop reason: HOSPADM

## 2017-10-19 RX ORDER — PREDNISOLONE ACETATE 10 MG/ML
1 SUSPENSION/ DROPS OPHTHALMIC 4 TIMES DAILY
Status: DISCONTINUED | OUTPATIENT
Start: 2017-10-19 | End: 2017-10-19 | Stop reason: HOSPADM

## 2017-10-19 RX ORDER — PHENYLEPHRINE HYDROCHLORIDE 100 MG/ML
1 SOLUTION/ DROPS OPHTHALMIC
Status: COMPLETED | OUTPATIENT
Start: 2017-10-19 | End: 2017-10-19

## 2017-10-19 RX ORDER — LIDOCAINE HYDROCHLORIDE 40 MG/ML
2 INJECTION, SOLUTION RETROBULBAR; TOPICAL
Status: DISCONTINUED | OUTPATIENT
Start: 2017-10-19 | End: 2017-10-19 | Stop reason: HOSPADM

## 2017-10-19 RX ORDER — POLYMYXIN B SULFATE AND TRIMETHOPRIM 1; 10000 MG/ML; [USP'U]/ML
SOLUTION OPHTHALMIC AS NEEDED
Status: DISCONTINUED | OUTPATIENT
Start: 2017-10-19 | End: 2017-10-19 | Stop reason: HOSPADM

## 2017-10-19 RX ORDER — CYCLOPENTOLATE HYDROCHLORIDE 20 MG/ML
SOLUTION/ DROPS OPHTHALMIC
Status: COMPLETED
Start: 2017-10-19 | End: 2017-10-19

## 2017-10-19 RX ORDER — PREDNISOLONE ACETATE 10 MG/ML
SUSPENSION/ DROPS OPHTHALMIC
Status: DISCONTINUED
Start: 2017-10-19 | End: 2017-10-19 | Stop reason: HOSPADM

## 2017-10-19 RX ORDER — TETRACAINE HYDROCHLORIDE 5 MG/ML
2 SOLUTION OPHTHALMIC AS NEEDED
Status: DISCONTINUED | OUTPATIENT
Start: 2017-10-19 | End: 2017-10-19 | Stop reason: HOSPADM

## 2017-10-19 RX ORDER — SODIUM CHLORIDE, SODIUM LACTATE, POTASSIUM CHLORIDE, CALCIUM CHLORIDE 600; 310; 30; 20 MG/100ML; MG/100ML; MG/100ML; MG/100ML
1000 INJECTION, SOLUTION INTRAVENOUS CONTINUOUS PRN
Status: DISCONTINUED | OUTPATIENT
Start: 2017-10-19 | End: 2017-10-19 | Stop reason: HOSPADM

## 2017-10-19 RX ORDER — BALANCED SALT SOLUTION 6.4; .75; .48; .3; 3.9; 1.7 MG/ML; MG/ML; MG/ML; MG/ML; MG/ML; MG/ML
SOLUTION OPHTHALMIC AS NEEDED
Status: DISCONTINUED | OUTPATIENT
Start: 2017-10-19 | End: 2017-10-19 | Stop reason: HOSPADM

## 2017-10-19 RX ORDER — EPINEPHRINE 1 MG/ML
INJECTION INTRAMUSCULAR; INTRAVENOUS; SUBCUTANEOUS AS NEEDED
Status: DISCONTINUED | OUTPATIENT
Start: 2017-10-19 | End: 2017-10-19 | Stop reason: HOSPADM

## 2017-10-19 RX ORDER — CYCLOPENTOLATE HYDROCHLORIDE 20 MG/ML
1 SOLUTION/ DROPS OPHTHALMIC
Status: COMPLETED | OUTPATIENT
Start: 2017-10-19 | End: 2017-10-19

## 2017-10-19 RX ADMIN — PHENYLEPHRINE HYDROCHLORIDE 1 DROP: 100 SOLUTION/ DROPS OPHTHALMIC at 08:34

## 2017-10-19 RX ADMIN — CYCLOPENTOLATE HYDROCHLORIDE 1 DROP: 20 SOLUTION/ DROPS OPHTHALMIC at 08:34

## 2017-10-19 RX ADMIN — MIDAZOLAM HYDROCHLORIDE 0.5 MG: 1 INJECTION, SOLUTION INTRAMUSCULAR; INTRAVENOUS at 09:58

## 2017-10-19 RX ADMIN — PHENYLEPHRINE HYDROCHLORIDE 1 DROP: 100 SOLUTION/ DROPS OPHTHALMIC at 08:44

## 2017-10-19 RX ADMIN — MIDAZOLAM HYDROCHLORIDE 0.5 MG: 1 INJECTION, SOLUTION INTRAMUSCULAR; INTRAVENOUS at 10:04

## 2017-10-19 RX ADMIN — CYCLOPENTOLATE HYDROCHLORIDE 1 DROP: 20 SOLUTION/ DROPS OPHTHALMIC at 08:39

## 2017-10-19 RX ADMIN — CYCLOPENTOLATE HYDROCHLORIDE 1 DROP: 20 SOLUTION/ DROPS OPHTHALMIC at 08:44

## 2017-10-19 RX ADMIN — SODIUM CHLORIDE, POTASSIUM CHLORIDE, SODIUM LACTATE AND CALCIUM CHLORIDE 1000 ML: 600; 310; 30; 20 INJECTION, SOLUTION INTRAVENOUS at 08:46

## 2017-10-19 RX ADMIN — PHENYLEPHRINE HYDROCHLORIDE 1 DROP: 100 SOLUTION/ DROPS OPHTHALMIC at 08:39

## 2017-10-19 NOTE — PLAN OF CARE
Problem: Cataract (Adult)  Goal: Signs and Symptoms of Listed Potential Problems Will be Absent or Manageable (Cataract)  Outcome: Ongoing (interventions implemented as appropriate)    10/19/17 2278   Cataract   Problems Assessed (Cataract) all   Problems Present (Cataract) none

## 2017-10-19 NOTE — ANESTHESIA PREPROCEDURE EVALUATION
Anesthesia Evaluation     Patient summary reviewed and Nursing notes reviewed   no history of anesthetic complications:  NPO Solid Status: > 8 hours  NPO Liquid Status: > 8 hours     Airway   Mallampati: II  TM distance: >3 FB  Neck ROM: full  no difficulty expected  Dental - normal exam     Pulmonary - normal exam   (+) a smoker Former, COPD,   Cardiovascular - normal exam    PT is on anticoagulation therapy  Patient on routine beta blocker and Beta blocker given within 24 hours of surgery    (+) hypertension, valvular problems/murmurs, CAD, PVD, hyperlipidemia      Neuro/Psych  GI/Hepatic/Renal/Endo    (+)  GERD, hypothyroidism,     Musculoskeletal     (+) arthralgias,   Abdominal  - normal exam   Substance History      OB/GYN          Other   (+) arthritis                                       Anesthesia Plan    ASA 3     MAC   (Risks and benefits discussed including risk of aspiration, recall and dental damage. All patient questions answered. Will continue with POC.)  intravenous induction   Anesthetic plan and risks discussed with patient.

## 2017-10-19 NOTE — ANESTHESIA POSTPROCEDURE EVALUATION
Patient: Teena Coe    Procedure Summary     Date Anesthesia Start Anesthesia Stop Room / Location    10/19/17 0957 1018  [facility]       Procedure Diagnosis Surgeon Provider    CATARACT PHACO EXTRACTION WITH INTRAOCULAR LENS IMPLANT RIGHT (Right Eye) No diagnosis on file. MD Alice Sherman CRNA          Anesthesia Type: MAC  Last vitals  BP   141/63 (10/19/17 1025)   Temp   98.6 °F (37 °C) (10/19/17 1025)   Pulse   67 (10/19/17 1025)   Resp   20 (10/19/17 1025)     SpO2   98 % (10/19/17 1025)     Post Anesthesia Care and Evaluation    Patient location during evaluation: PHASE II  Patient participation: complete - patient participated  Level of consciousness: awake and alert  Pain score: 0  Pain management: satisfactory to patient  Airway patency: patent  Anesthetic complications: No anesthetic complications  PONV Status: none  Cardiovascular status: acceptable and stable  Respiratory status: acceptable  Hydration status: acceptable

## 2017-10-19 NOTE — H&P
[id number]  Teena Coe  female  1940  Rodrigo Dumont MD  10/19/2017  PATIENT NAME: Teena Coe    [facility]  PREOPERATIVE PHYSICAL EXAMINATION    Temp:  [97.8 °F (36.6 °C)] 97.8 °F (36.6 °C)  Heart Rate:  [64] 64  Resp:  [18] 18  BP: (154)/(66) 154/66    Past Medical History:   Diagnosis Date   • Arthritis    • Cataracts, bilateral    • Disease of thyroid gland    • Emphysema of lung    • Full dentures    • GERD (gastroesophageal reflux disease)    • Heart murmur    • Hyperlipidemia    • Hypertension    • Wears glasses        Past Surgical History:   Procedure Laterality Date   • CATARACT EXTRACTION W/ INTRAOCULAR LENS IMPLANT Left 10/5/2017    Procedure: CATARACT PHACO EXTRACTION WITH INTRAOCULAR LENS IMPLANT LEFT;  Surgeon: Rodrigo Dumont MD;  Location: [facility];  Service:    • COLONOSCOPY     • MULTIPLE TOOTH EXTRACTIONS         Social History     Social History   • Marital status:      Spouse name: N/A   • Number of children: N/A   • Years of education: N/A     Occupational History   • Not on file.     Social History Main Topics   • Smoking status: Former Smoker   • Smokeless tobacco: Never Used      Comment: QUIT 30 YEARS AGO   • Alcohol use No   • Drug use: No   • Sexual activity: Defer     Other Topics Concern   • Not on file     Social History Narrative       Family History   Problem Relation Age of Onset   • Diabetes Brother    • Breast cancer Daughter        Prescriptions Prior to Admission   Medication Sig Dispense Refill Last Dose   • albuterol (PROVENTIL HFA;VENTOLIN HFA) 108 (90 BASE) MCG/ACT inhaler Inhale 2 puffs Every 4 (Four) Hours As Needed for Wheezing. 18 g 3 Past Month at Unknown time   • aspirin 81 MG tablet Take  by mouth Daily.   10/18/2017 at 2130   • Cholecalciferol (VITAMIN D PO) Take  by mouth Daily.   10/18/2017 at 0800   • Cyanocobalamin (VITAMIN B-12 CR PO) Take  by mouth Daily.   10/18/2017 at 0800   • fluticasone (FLONASE) 50 MCG/ACT nasal spray into each nostril  Daily As Needed.   Past Month at Unknown time   • hydrochlorothiazide (MICROZIDE) 12.5 MG capsule TAKE 1 TO 2 CAPSULES DAILY FOR BLOOD PRESSURE 180 capsule 1 10/19/2017 at 0630   • levothyroxine (SYNTHROID, LEVOTHROID) 100 MCG tablet Take 1 tablet by mouth Daily. 90 tablet 3 10/18/2017 at 0700   • losartan (COZAAR) 50 MG tablet TAKE 1 TABLET EVERY DAY 90 tablet 1 10/18/2017 at 2130   • Multiple Vitamins-Minerals (CENTRUM ADULTS PO) Take  by mouth Daily.   10/18/2017 at 0800   • omeprazole (PriLOSEC) 20 MG capsule Take 20 mg by mouth Daily.   10/18/2017 at 0800   • pravastatin (PRAVACHOL) 80 MG tablet TAKE 1 TABLET EVERY DAY 90 tablet 1 10/18/2017 at 2100   • traMADol (ULTRAM) 50 MG tablet Take 1 tablet by mouth 2 (Two) Times a Day As Needed for Moderate Pain (4-6) or Severe Pain (7-10). 6 tablet 0 10/18/2017 at 2200        Within Normal Limits Abnormal   Mental Status [x]    []     Head, Neck, and Throat [x]   []     Chest/Lungs [x]   []     Cardiovascular [x]   []     Abdomen [x]   []     Extremities [x]   []     Neurologic [x]   []     Other       Diagnosis: Cataract      STATEMENT AS TO REASON OF PLANNED OPERATION:  [x]   H&P revealed no contraindication to planned surgery. (Check if correct).    [x]   Labs (if ordered) have been reviewed and revealed no contraindications to planned surgery. (Check if correct).    [x]   Patient has been advised to see her local medical doctor/family doctor for follow-up regarding systemic care and/or abnormal labs.  (Check if correct).    Rodrigo Dumont MD  10/19/2017

## 2017-10-19 NOTE — OP NOTE
"Patient Name: Teena Coe  Patient Number: [id number]    PRE-OPERATIVE DIAGNOSIS:  Cataract [x]  OD, []  OS  H25.1()    POST-OPERATIVE DIAGNOSIS:  Same    PROCEDURE:     CATARACT PHACO EXTRACTION WITH INTRAOCULAR LENS IMPLANT RIGHT (Right)    Surgeon:      Rodrigo Dumont MD    Date of Operation:    10/19/2017    ANESTHESIA:   MAC  COMPLICATIONS:    None  SPECIMEN REMOVED:  Cataract  ESTIMATED BLOOD LOSS:  None    After identifying the patient and obtained fully-informed consent, preoperative drops placed as ordered.  Pre-operative \"time out\" performed.  Patient brought into the operating room and positioned.  Cardiac monitoring was instituted.  Anesthetic gtt to operative eye.      After a surgical scrub, the patient was prepped and draped in the standard ophthalmic fashion.  Lid speculum. Paracentesis. Viscoelastic. Keratome tunneled into anterior chamber.  Capsulorrhexis. Hydrodissection.    Phaco machine tested and found to be in good working order.  Two-handed phacoemulsification performed.     I/A to remove residual cortex.  Viscoelastic in capsular bag.  Intraocular lens placed in standard fashion and centered.  I/A to remove viscoelastic.  Wound hydrated, tested, and found to be watertight.      Lid speculum and drapes removed.  Antibiotic gtt. Eye shield.  Patient to recovery area.  Verbal and written discharge instructions given.  Follow-up appointment given.    Rodrigo Dumont MD      Immediate Post-Op Note  Date: 10/19/2017 10:18 AM      "

## 2017-12-01 ENCOUNTER — OFFICE VISIT (OUTPATIENT)
Dept: INTERNAL MEDICINE | Facility: CLINIC | Age: 77
End: 2017-12-01

## 2017-12-01 VITALS
SYSTOLIC BLOOD PRESSURE: 128 MMHG | HEIGHT: 63 IN | HEART RATE: 64 BPM | DIASTOLIC BLOOD PRESSURE: 70 MMHG | TEMPERATURE: 97.4 F | WEIGHT: 156 LBS | BODY MASS INDEX: 27.64 KG/M2 | OXYGEN SATURATION: 97 %

## 2017-12-01 DIAGNOSIS — K21.9 GASTROESOPHAGEAL REFLUX DISEASE WITHOUT ESOPHAGITIS: ICD-10-CM

## 2017-12-01 DIAGNOSIS — E78.2 MIXED HYPERLIPIDEMIA: Primary | ICD-10-CM

## 2017-12-01 DIAGNOSIS — E03.8 OTHER SPECIFIED HYPOTHYROIDISM: ICD-10-CM

## 2017-12-01 DIAGNOSIS — I10 BENIGN ESSENTIAL HYPERTENSION: ICD-10-CM

## 2017-12-01 PROCEDURE — 99213 OFFICE O/P EST LOW 20 MIN: CPT | Performed by: INTERNAL MEDICINE

## 2017-12-01 NOTE — PROGRESS NOTES
"Chief Complaint   Patient presents with   • Follow-up     4 months for HTN, GERD, HLD, and Hypothyroidism. No new complaints.      Subjective   Teena Coe is a 77 y.o. female.     HPI Comments: Here for follow up of HTN, HLD, hypothyroid, GERD, vitD def, allergies.  Denies CP, SOB, some occasional edema. She does get SOB with exertion at times.   She has had bilateral cataract removal since last visit.  She denies any GERD sxs.   No allergy sxs.   Takes her vit d and B12 daily.  She denies any constipation or diarrhea.  No heat or cold intolerance.  No recent weight fluctuation.  She had her flu and pneumonia shot in September.              The following portions of the patient's history were reviewed and updated as appropriate: allergies, current medications, past family history, past medical history, past social history, past surgical history and problem list.    Review of Systems   Constitutional: Negative for unexpected weight change.   Respiratory: Positive for shortness of breath.    Cardiovascular: Positive for leg swelling. Negative for chest pain and palpitations.   Gastrointestinal: Negative.    Endocrine: Negative for cold intolerance and heat intolerance.   Musculoskeletal: Negative for myalgias.   All other systems reviewed and are negative.      Objective   /70  Pulse 64  Temp 97.4 °F (36.3 °C)  Ht 63\" (160 cm)  Wt 156 lb (70.8 kg)  SpO2 97%  BMI 27.63 kg/m2  Body mass index is 27.63 kg/(m^2).  Physical Exam   Constitutional: She is oriented to person, place, and time. She appears well-developed and well-nourished.   HENT:   Head: Normocephalic and atraumatic.   Mouth/Throat: Oropharynx is clear and moist.   Eyes: Conjunctivae and EOM are normal. Pupils are equal, round, and reactive to light.   Neck: Normal range of motion. Neck supple. No thyromegaly present.   Cardiovascular: Normal rate, regular rhythm and intact distal pulses.    Murmur heard.  Systolic murmur grade 2/6 at left " sternal border   Pulmonary/Chest: Effort normal and breath sounds normal. No respiratory distress.   Abdominal: Soft. Bowel sounds are normal.   Musculoskeletal: She exhibits no edema.   Lymphadenopathy:     She has no cervical adenopathy.   Neurological: She is alert and oriented to person, place, and time. No cranial nerve deficit.   Psychiatric: She has a normal mood and affect. Judgment normal.   Nursing note and vitals reviewed.      Assessment/Plan   Teena Coe is here today and the following problems have been addressed:      Teena was seen today for follow-up.    Diagnoses and all orders for this visit:    Mixed hyperlipidemia    Benign essential hypertension    Other specified hypothyroidism    Gastroesophageal reflux disease without esophagitis    Follow heart healthy/low salt diet  Avoid processed foods  Monitor blood pressure as discussed  Exercise as tolerated up to 30 minutes 5 days per week  Take all medications as prescribed  Recommend she take her omeprazole and levothyroxine on empty stomach  Avoid large meals and late night snacks  Her labs are UTD    RTC 4 mo, labs then    Please note that portions of this note were completed with a voice recognition program.  Efforts were made to edit dictation, but occasionally words are mistranscribed.

## 2017-12-13 DIAGNOSIS — K21.9 GASTROESOPHAGEAL REFLUX DISEASE WITHOUT ESOPHAGITIS: ICD-10-CM

## 2017-12-13 RX ORDER — PRAVASTATIN SODIUM 80 MG/1
TABLET ORAL
Qty: 90 TABLET | Refills: 1 | Status: SHIPPED | OUTPATIENT
Start: 2017-12-13 | End: 2018-06-11 | Stop reason: SDUPTHER

## 2017-12-14 RX ORDER — RANITIDINE 150 MG/1
CAPSULE ORAL
Qty: 180 CAPSULE | Refills: 3 | Status: SHIPPED | OUTPATIENT
Start: 2017-12-14 | End: 2019-03-08 | Stop reason: SDUPTHER

## 2018-03-19 ENCOUNTER — TRANSCRIBE ORDERS (OUTPATIENT)
Dept: ADMINISTRATIVE | Facility: HOSPITAL | Age: 78
End: 2018-03-19

## 2018-03-19 DIAGNOSIS — Z12.39 SCREENING BREAST EXAMINATION: Primary | ICD-10-CM

## 2018-04-04 ENCOUNTER — OFFICE VISIT (OUTPATIENT)
Dept: INTERNAL MEDICINE | Facility: CLINIC | Age: 78
End: 2018-04-04

## 2018-04-04 VITALS
WEIGHT: 154.5 LBS | DIASTOLIC BLOOD PRESSURE: 72 MMHG | OXYGEN SATURATION: 94 % | BODY MASS INDEX: 27.38 KG/M2 | HEART RATE: 66 BPM | SYSTOLIC BLOOD PRESSURE: 126 MMHG | HEIGHT: 63 IN | TEMPERATURE: 97.4 F

## 2018-04-04 DIAGNOSIS — I10 BENIGN ESSENTIAL HYPERTENSION: Primary | ICD-10-CM

## 2018-04-04 DIAGNOSIS — E78.2 MIXED HYPERLIPIDEMIA: ICD-10-CM

## 2018-04-04 DIAGNOSIS — L98.9 SKIN LESION: ICD-10-CM

## 2018-04-04 DIAGNOSIS — K21.9 GASTROESOPHAGEAL REFLUX DISEASE WITHOUT ESOPHAGITIS: ICD-10-CM

## 2018-04-04 DIAGNOSIS — E03.8 OTHER SPECIFIED HYPOTHYROIDISM: ICD-10-CM

## 2018-04-04 LAB
ALBUMIN SERPL-MCNC: 4.2 G/DL (ref 3.5–5)
ALBUMIN/GLOB SERPL: 1.4 G/DL (ref 1–2)
ALP SERPL-CCNC: 60 U/L (ref 38–126)
ALT SERPL-CCNC: 23 U/L (ref 13–69)
AST SERPL-CCNC: 24 U/L (ref 15–46)
BASOPHILS # BLD AUTO: 0.06 10*3/MM3 (ref 0–0.2)
BASOPHILS NFR BLD AUTO: 0.8 % (ref 0–2.5)
BILIRUB SERPL-MCNC: 0.8 MG/DL (ref 0.2–1.3)
BUN SERPL-MCNC: 22 MG/DL (ref 7–20)
BUN/CREAT SERPL: 18.3 (ref 7.1–23.5)
CALCIUM SERPL-MCNC: 10.6 MG/DL (ref 8.4–10.2)
CHLORIDE SERPL-SCNC: 97 MMOL/L (ref 98–107)
CHOLEST SERPL-MCNC: 163 MG/DL (ref 0–199)
CO2 SERPL-SCNC: 30 MMOL/L (ref 26–30)
CREAT SERPL-MCNC: 1.2 MG/DL (ref 0.6–1.3)
EOSINOPHIL # BLD AUTO: 0.25 10*3/MM3 (ref 0–0.7)
EOSINOPHIL NFR BLD AUTO: 3.2 % (ref 0–7)
ERYTHROCYTE [DISTWIDTH] IN BLOOD BY AUTOMATED COUNT: 12.8 % (ref 11.5–14.5)
GFR SERPLBLD CREATININE-BSD FMLA CKD-EPI: 43 ML/MIN/1.73
GFR SERPLBLD CREATININE-BSD FMLA CKD-EPI: 53 ML/MIN/1.73
GLOBULIN SER CALC-MCNC: 3 GM/DL
GLUCOSE SERPL-MCNC: 100 MG/DL (ref 74–98)
HCT VFR BLD AUTO: 43.4 % (ref 37–47)
HDLC SERPL-MCNC: 60 MG/DL (ref 40–60)
HGB BLD-MCNC: 14.4 G/DL (ref 12–16)
IMM GRANULOCYTES # BLD: 0.02 10*3/MM3 (ref 0–0.06)
IMM GRANULOCYTES NFR BLD: 0.3 % (ref 0–0.6)
LDLC SERPL CALC-MCNC: 71 MG/DL (ref 0–99)
LYMPHOCYTES # BLD AUTO: 2.72 10*3/MM3 (ref 0.6–3.4)
LYMPHOCYTES NFR BLD AUTO: 35.1 % (ref 10–50)
MCH RBC QN AUTO: 31 PG (ref 27–31)
MCHC RBC AUTO-ENTMCNC: 33.2 G/DL (ref 30–37)
MCV RBC AUTO: 93.3 FL (ref 81–99)
MONOCYTES # BLD AUTO: 0.7 10*3/MM3 (ref 0–0.9)
MONOCYTES NFR BLD AUTO: 9 % (ref 0–12)
NEUTROPHILS # BLD AUTO: 3.99 10*3/MM3 (ref 2–6.9)
NEUTROPHILS NFR BLD AUTO: 51.6 % (ref 37–80)
NRBC BLD AUTO-RTO: 0 /100 WBC (ref 0–0)
PLATELET # BLD AUTO: 236 10*3/MM3 (ref 130–400)
POTASSIUM SERPL-SCNC: 4.2 MMOL/L (ref 3.5–5.1)
PROT SERPL-MCNC: 7.2 G/DL (ref 6.3–8.2)
RBC # BLD AUTO: 4.65 10*6/MM3 (ref 4.2–5.4)
SODIUM SERPL-SCNC: 140 MMOL/L (ref 137–145)
T4 FREE SERPL-MCNC: 1.86 NG/DL (ref 0.78–2.19)
TRIGL SERPL-MCNC: 161 MG/DL
TSH SERPL DL<=0.005 MIU/L-ACNC: 0.61 MIU/ML (ref 0.47–4.68)
VLDLC SERPL CALC-MCNC: 32.2 MG/DL
WBC # BLD AUTO: 7.74 10*3/MM3 (ref 4.8–10.8)

## 2018-04-04 PROCEDURE — 99214 OFFICE O/P EST MOD 30 MIN: CPT | Performed by: INTERNAL MEDICINE

## 2018-04-04 NOTE — PROGRESS NOTES
Please call patient with lab results.  Thyroid function tests are normal.  Complete blood count is normal.  She has an elevated calcium level and appears slightly dehydrated, both of these are likely due to use of her hydrochlorothiazide.  She needs to increase fluid intake.

## 2018-04-04 NOTE — PROGRESS NOTES
"Chief Complaint   Patient presents with   • Follow-up     4 months for HTN, GERD, HLD, and Hypothyroidism. Pt states she has a spot near her privates she would like looked at.      Subjective   Teena Coe is a 78 y.o. female.     Here for follow up of HTN, HLD, hypothyroid, GERD, vitD def, allergies.  Denies CP, SOB, some occasional edema.  BP is well controlled today. Home reads run about the same as it is here today.   She has found a lump/mass in genital area about a month ago.  No pain or itching.  No vaginal discharge.  She has not noted a change in size.   No GERD sxs.   Takes her thyroid med on empty stomach daily.  Also takes her vit D and B12 also.   She denies any current allergies.   She had [employer] doctor come to house.  She only scored 2 of 3 on 5 min recall.           The following portions of the patient's history were reviewed and updated as appropriate: allergies, current medications, past family history, past medical history, past social history, past surgical history and problem list.    Review of Systems   Constitutional: Negative for unexpected weight change.   Respiratory: Negative for shortness of breath.    Cardiovascular: Positive for leg swelling. Negative for chest pain and palpitations.   Gastrointestinal: Negative.  Negative for constipation and diarrhea.   Endocrine: Negative for cold intolerance and heat intolerance.   Genitourinary: Negative for vaginal discharge and vaginal pain.   Skin:        Skin lesion in genital area   Psychiatric/Behavioral: Positive for confusion.       Objective   /72   Pulse 66   Temp 97.4 °F (36.3 °C)   Ht 160 cm (63\")   Wt 70.1 kg (154 lb 8 oz)   SpO2 94%   BMI 27.37 kg/m²   Body mass index is 27.37 kg/m².  Physical Exam   Constitutional: She is oriented to person, place, and time. She appears well-developed and well-nourished.   HENT:   Head: Normocephalic and atraumatic.   Mouth/Throat: Oropharynx is clear and moist.   Eyes: " Conjunctivae and EOM are normal. Pupils are equal, round, and reactive to light.   Neck: Normal range of motion. Neck supple. No thyromegaly present.   Cardiovascular: Normal rate, regular rhythm, normal heart sounds and intact distal pulses.    No murmur heard.  Pulmonary/Chest: Effort normal and breath sounds normal. No respiratory distress.   Abdominal: Soft. Bowel sounds are normal.   Musculoskeletal: She exhibits no edema.   Lymphadenopathy:     She has no cervical adenopathy.   Neurological: She is alert and oriented to person, place, and time. No cranial nerve deficit.   Skin:   1 cm pink/tan nodular lesion inferior and to left of introitus, well-circumscribed edges, no vaginal discharge noted   Psychiatric: She has a normal mood and affect. Judgment normal.   Nursing note and vitals reviewed.      Assessment/Plan   Teena Coe is here today and the following problems have been addressed:      Teena was seen today for follow-up.    Diagnoses and all orders for this visit:    Benign essential hypertension  -     CBC & Differential  -     Comprehensive Metabolic Panel    Mixed hyperlipidemia  -     Comprehensive Metabolic Panel  -     Lipid Panel    Gastroesophageal reflux disease without esophagitis    Other specified hypothyroidism  -     T4, Free  -     TSH    Skin lesion  -     Ambulatory Referral to Gynecology    Follow heart healthy/low salt diet  Avoid processed foods  Monitor blood pressure as discussed  Exercise as tolerated up to 30 minutes 5 days per week  Take all medications as prescribed  Labs as noted  Continue omeprazole, no GERD sxs  Refer to GYN/Onc for biopsy of skin lesion in  area    RTC 4 mo    Please note that portions of this note were completed with a voice recognition program.  Efforts were made to edit dictation, but occasionally words are mistranscribed.

## 2018-04-05 ENCOUNTER — TELEPHONE (OUTPATIENT)
Dept: INTERNAL MEDICINE | Facility: CLINIC | Age: 78
End: 2018-04-05

## 2018-04-05 NOTE — TELEPHONE ENCOUNTER
----- Message from Marilyn K Vermeesch, MD sent at 4/4/2018  4:58 PM EDT -----  Please call patient with lab results.  Thyroid function tests are normal.  Complete blood count is normal.  She has an elevated calcium level and appears slightly dehydrated, both of these are likely due to use of her hydrochlorothiazide.  She needs   to increase fluid intake.

## 2018-04-10 RX ORDER — LOSARTAN POTASSIUM 50 MG/1
TABLET ORAL
Qty: 90 TABLET | Refills: 1 | Status: SHIPPED | OUTPATIENT
Start: 2018-04-10 | End: 2018-09-12 | Stop reason: SDUPTHER

## 2018-04-10 RX ORDER — HYDROCHLOROTHIAZIDE 12.5 MG/1
CAPSULE, GELATIN COATED ORAL
Qty: 180 CAPSULE | Refills: 1 | Status: SHIPPED | OUTPATIENT
Start: 2018-04-10 | End: 2018-09-12 | Stop reason: SDUPTHER

## 2018-04-19 ENCOUNTER — HOSPITAL ENCOUNTER (OUTPATIENT)
Dept: MAMMOGRAPHY | Facility: HOSPITAL | Age: 78
Discharge: HOME | End: 2018-04-19
Admitting: INTERNAL MEDICINE

## 2018-04-19 DIAGNOSIS — Z12.39 SCREENING BREAST EXAMINATION: ICD-10-CM

## 2018-04-19 PROCEDURE — 77067 SCR MAMMO BI INCL CAD: CPT

## 2018-04-19 PROCEDURE — 77063 BREAST TOMOSYNTHESIS BI: CPT

## 2018-04-20 ENCOUNTER — OFFICE VISIT (OUTPATIENT)
Dept: GYNECOLOGIC ONCOLOGY | Facility: CLINIC | Age: 78
End: 2018-04-20

## 2018-04-20 VITALS
BODY MASS INDEX: 28.89 KG/M2 | WEIGHT: 157 LBS | DIASTOLIC BLOOD PRESSURE: 57 MMHG | SYSTOLIC BLOOD PRESSURE: 187 MMHG | RESPIRATION RATE: 18 BRPM | TEMPERATURE: 98 F | HEIGHT: 62 IN | HEART RATE: 72 BPM | OXYGEN SATURATION: 94 %

## 2018-04-20 DIAGNOSIS — N90.89 VULVAR LESION: Primary | ICD-10-CM

## 2018-04-20 DIAGNOSIS — L98.9 SKIN LESION: ICD-10-CM

## 2018-04-20 PROCEDURE — 99202 OFFICE O/P NEW SF 15 MIN: CPT | Performed by: OBSTETRICS & GYNECOLOGY

## 2018-04-20 NOTE — PROGRESS NOTES
GYN ONCOLOGY FOLLOW-UP    Teena Coe  [id number]  1940    Chief Complaint:     1. Vulvar lesion    History of present illness:  Teena Coe is a 78 y.o. year old female who is here today for consultation at the request of Marilyn K Vermeesch, MD.  For evaluation and management of a vulvar lesion.    The patient reports that a small nodular area in the posterior left vulva has been present for a few weeks to months.  She describes no bleeding, no pain, no itching or any other symptomatology.      Oncology History:     No history exists.       Past Medical History:   Diagnosis Date   • Arthritis    • Disease of thyroid gland    • Emphysema of lung    • Full dentures    • GERD (gastroesophageal reflux disease)    • Heart murmur    • History of cataract    • History of TB (tuberculosis)     As a child.  Pt cannot recall treatment recieved.   • Hyperlipidemia    • Hypertension    • Hypothyroid    • Wears glasses        Past Surgical History:   Procedure Laterality Date   • CATARACT EXTRACTION W/ INTRAOCULAR LENS IMPLANT Left 10/5/2017    Procedure: CATARACT PHACO EXTRACTION WITH INTRAOCULAR LENS IMPLANT LEFT;  Surgeon: Rodrigo Dumont MD;  Location: [facility];  Service:    • CATARACT EXTRACTION W/ INTRAOCULAR LENS IMPLANT Right 10/19/2017    Procedure: CATARACT PHACO EXTRACTION WITH INTRAOCULAR LENS IMPLANT RIGHT;  Surgeon: Rodrigo Dumont MD;  Location: [facility];  Service:    • COLONOSCOPY     • MULTIPLE TOOTH EXTRACTIONS         MEDICATIONS: The current medication list was reviewed and reconciled.     Allergies:  has No Known Allergies.    Family History   Problem Relation Age of Onset   • Diabetes Brother    • Breast cancer Daughter 52              Review of Systems   Constitutional: Negative.    HENT: Negative.    Eyes: Negative.    Respiratory: Negative.    Cardiovascular: Negative.    Gastrointestinal: Negative.    Genitourinary: Negative.    Psychiatric/Behavioral: Negative.        Physical Exam  Vital Signs:  "BP (!) 187/57   Pulse 72   Temp 98 °F (36.7 °C) (Temporal Artery )   Resp 18   Ht 157.5 cm (62\")   Wt 71.2 kg (157 lb)   SpO2 94%   BMI 28.72 kg/m²    General Appearance:  alert, cooperative, no apparent distress and appears stated age   Neurologic/Psychiatric: A&O x 3, gait steady, appropriate affect   HEENT:  Normocephalic, without obvious abnormality, mucous membranes moist   Neck: Supple, symmetrical, trachea midline, no adenopathy;  No thyromegaly, masses, or tenderness   Back:   Symmetric, no curvature, ROM normal, no CVA tenderness   Lungs:   Clear to auscultation bilaterally; respirations regular, even, and unlabored bilaterally   Heart:  Regular rate and rhythm, no murmurs appreciated   Breasts:  deferred   Abdomen:   Soft, non-tender, non-distended and no organomegaly   Lymph nodes: No cervical, supraclavicular, inguinal or axillary adenopathy noted   Extremities: Normal, atraumatic; no clubbing, cyanosis, or edema    Pelvic: External Genitalia  without lesions or skin changes, On the left posterior vulva there is a 0.75 cm cystic nodular area with a central opening.  Vagina  is pale, atrophic.   Cervix  normal, without lesions, no discharge, no CMT, multiparous and pap smear obtained  Uterus  normal size, midposition, mobile and nontender  Ovaries  without palpable masses or fullness  Parametria  smooth  Rectovaginal  Female rectovaginal: confirms no masses or bleeding and Hemoccult negative     ECOG Performance Status: 0 - Asymptomatic    Procedure Notes:  After informing the patient scalpel blade was utilized to unroof the left vulvar cyst.  Approximately 1.5 mL of sebaceous material removed.  The cyst was totally obliterated.  The bed was treated with silver nitrate.  The patient experienced minimal discomfort.    Assessment and Plan:    Mrs. Coe is a 78-year-old referred for management of a vulvar nodule.  Lesion was consistent with a sebaceous cyst which was unroofed and evacuated.  No " follow-up is necessary.  A Pap smear was performed.  The Hemoccult on rectal examination was negative.          Mike Dillon MD      Note: Speech recognition transcription software was used to dictate portions of this document.  An attempt at proofreading has been made though minor errors in transcription may still be present.  Please do not hesitate to call our office with any questions.

## 2018-06-12 RX ORDER — PRAVASTATIN SODIUM 80 MG/1
TABLET ORAL
Qty: 90 TABLET | Refills: 1 | Status: SHIPPED | OUTPATIENT
Start: 2018-06-12 | End: 2018-12-07 | Stop reason: SDUPTHER

## 2018-06-12 RX ORDER — LEVOTHYROXINE SODIUM 0.1 MG/1
TABLET ORAL
Qty: 90 TABLET | Refills: 3 | Status: SHIPPED | OUTPATIENT
Start: 2018-06-12 | End: 2019-08-03 | Stop reason: SDUPTHER

## 2018-08-07 ENCOUNTER — OFFICE VISIT (OUTPATIENT)
Dept: INTERNAL MEDICINE | Facility: CLINIC | Age: 78
End: 2018-08-07

## 2018-08-07 VITALS
BODY MASS INDEX: 29.44 KG/M2 | SYSTOLIC BLOOD PRESSURE: 126 MMHG | HEIGHT: 62 IN | TEMPERATURE: 97.4 F | HEART RATE: 68 BPM | DIASTOLIC BLOOD PRESSURE: 68 MMHG | OXYGEN SATURATION: 96 % | WEIGHT: 160 LBS

## 2018-08-07 DIAGNOSIS — I10 BENIGN ESSENTIAL HYPERTENSION: Primary | ICD-10-CM

## 2018-08-07 DIAGNOSIS — E78.2 MIXED HYPERLIPIDEMIA: ICD-10-CM

## 2018-08-07 DIAGNOSIS — J30.1 CHRONIC SEASONAL ALLERGIC RHINITIS DUE TO POLLEN: ICD-10-CM

## 2018-08-07 DIAGNOSIS — E03.8 OTHER SPECIFIED HYPOTHYROIDISM: ICD-10-CM

## 2018-08-07 DIAGNOSIS — K21.9 GASTROESOPHAGEAL REFLUX DISEASE WITHOUT ESOPHAGITIS: ICD-10-CM

## 2018-08-07 PROBLEM — N90.89 VULVAR LESION: Status: RESOLVED | Noted: 2018-04-20 | Resolved: 2018-08-07

## 2018-08-07 PROBLEM — L98.9 SKIN LESION: Status: RESOLVED | Noted: 2018-04-04 | Resolved: 2018-08-07

## 2018-08-07 PROCEDURE — 99213 OFFICE O/P EST LOW 20 MIN: CPT | Performed by: INTERNAL MEDICINE

## 2018-08-07 NOTE — PROGRESS NOTES
"Chief Complaint   Patient presents with   • Follow-up     4 months for HTN, GERD, HLD, and Hypothyroidism. Pt states she's had a cough for a while now.      Subjective   Teena Coe is a 78 y.o. female.     Here for follow up of HTN, HLD, hypothyroid, GERD, vitD def, allergies.  HTN/HLD- Denies CP, SOB, some occasional edema.  BP is well controlled today. Home reads run about the same as it is here today.   Hypothyroid- takes her thyroid med daily. No difficulty swallowing or change in voice  GERD- she denies any GERD sxs.  Takes her prilosec daily.   Vit D def- she takes her vit D daily.  HCM- pt just had pap with cyst removed after last visit here.   She has had a cough on and off for over a month.  Usually a dry cough.  No EA or ST.  Occasionally has a RN.  Her cough is worse during the day.  She does not use her flonase daily. She states that she does wheeze on occasion.          The following portions of the patient's history were reviewed and updated as appropriate: allergies, current medications, past family history, past medical history, past social history, past surgical history and problem list.    Review of Systems   Constitutional: Negative for chills, fever and unexpected weight change.   HENT: Positive for postnasal drip and rhinorrhea. Negative for congestion, trouble swallowing and voice change.    Respiratory: Positive for cough and wheezing.    Cardiovascular: Positive for leg swelling. Negative for chest pain and palpitations.   Gastrointestinal: Negative.    Endocrine: Negative for cold intolerance and heat intolerance.   Musculoskeletal: Negative for myalgias.   Allergic/Immunologic: Positive for environmental allergies.   All other systems reviewed and are negative.      Objective   /68   Pulse 68   Temp 97.4 °F (36.3 °C)   Ht 157.5 cm (62\")   Wt 72.6 kg (160 lb)   SpO2 96%   BMI 29.26 kg/m²   Body mass index is 29.26 kg/m².  Physical Exam   Constitutional: She is oriented to " person, place, and time. She appears well-developed and well-nourished.   Pleasant female in no apparent distress, occasionally clears her throat throughout office visit   HENT:   Head: Normocephalic and atraumatic.   Right Ear: External ear normal.   Left Ear: External ear normal.   Mouth/Throat: Oropharynx is clear and moist.   Clear postnasal drip   Eyes: Pupils are equal, round, and reactive to light. Conjunctivae and EOM are normal.   Neck: Normal range of motion. Neck supple. No thyromegaly present.   Cardiovascular: Normal rate, regular rhythm, normal heart sounds and intact distal pulses.    No murmur heard.  Pulmonary/Chest: Effort normal and breath sounds normal. No respiratory distress. She has no wheezes. She has no rales.   Abdominal: Soft. Bowel sounds are normal.   Musculoskeletal: She exhibits no edema.   Lymphadenopathy:     She has no cervical adenopathy.   Neurological: She is alert and oriented to person, place, and time. No cranial nerve deficit.   Psychiatric: She has a normal mood and affect. Her behavior is normal. Judgment and thought content normal.   Nursing note and vitals reviewed.      Assessment/Plan   Teena Coe is here today and the following problems have been addressed:      Teena was seen today for follow-up.    Diagnoses and all orders for this visit:    Benign essential hypertension    Mixed hyperlipidemia    Gastroesophageal reflux disease without esophagitis    Other specified hypothyroidism    Chronic seasonal allergic rhinitis due to pollen    Follow heart healthy/low salt diet  Avoid processed foods  Monitor blood pressure as discussed  Exercise as tolerated up to 30 minutes 5 days per week  Take all medications as prescribed  Continue omeprazole for GERD  Recommend she take her flonase daily and add zyrtec 10 mg qhs for current allergy symptoms with associated postnasal drip-this is likely cause of her cough    RTC 4 mo    Please note that portions of this note were  completed with a voice recognition program.  Efforts were made to edit dictation, but occasionally words are mistranscribed.

## 2018-08-14 RX ORDER — ALBUTEROL SULFATE 90 UG/1
AEROSOL, METERED RESPIRATORY (INHALATION)
Qty: 18 G | Refills: 3 | Status: SHIPPED | OUTPATIENT
Start: 2018-08-14 | End: 2018-10-09 | Stop reason: SDUPTHER

## 2018-09-13 RX ORDER — LOSARTAN POTASSIUM 50 MG/1
TABLET ORAL
Qty: 90 TABLET | Refills: 1 | Status: SHIPPED | OUTPATIENT
Start: 2018-09-13 | End: 2019-03-12 | Stop reason: SDUPTHER

## 2018-09-13 RX ORDER — HYDROCHLOROTHIAZIDE 12.5 MG/1
CAPSULE, GELATIN COATED ORAL
Qty: 180 CAPSULE | Refills: 1 | Status: SHIPPED | OUTPATIENT
Start: 2018-09-13 | End: 2019-03-12 | Stop reason: SDUPTHER

## 2018-10-10 RX ORDER — ALBUTEROL SULFATE 90 UG/1
AEROSOL, METERED RESPIRATORY (INHALATION)
Qty: 18 G | Refills: 3 | Status: SHIPPED | OUTPATIENT
Start: 2018-10-10 | End: 2019-04-29 | Stop reason: SDUPTHER

## 2018-12-07 ENCOUNTER — OFFICE VISIT (OUTPATIENT)
Dept: INTERNAL MEDICINE | Facility: CLINIC | Age: 78
End: 2018-12-07

## 2018-12-07 DIAGNOSIS — K21.9 GASTROESOPHAGEAL REFLUX DISEASE WITHOUT ESOPHAGITIS: ICD-10-CM

## 2018-12-07 DIAGNOSIS — J30.2 SEASONAL ALLERGIES: ICD-10-CM

## 2018-12-07 DIAGNOSIS — E03.8 OTHER SPECIFIED HYPOTHYROIDISM: ICD-10-CM

## 2018-12-07 DIAGNOSIS — I10 BENIGN ESSENTIAL HYPERTENSION: Primary | ICD-10-CM

## 2018-12-07 DIAGNOSIS — E78.2 MIXED HYPERLIPIDEMIA: ICD-10-CM

## 2018-12-07 PROCEDURE — 99214 OFFICE O/P EST MOD 30 MIN: CPT | Performed by: PHYSICIAN ASSISTANT

## 2018-12-07 NOTE — PROGRESS NOTES
Subjective     Chief Complaint: 4 month f/u     History of Present Illness     Teena Coe is a 78 y.o. female presenting for 4 month follow up on HTN, HLD, hypothyroidism, GERD, allergies. Blood pressure running well at home. Home readings 120s-130s/ 70s-80s.She denies CP or SOA, notes LE edema at times relieved by elevating legs. Takes her thyroid med daily on an empty stomach. Denies any GERD symptoms on prilosec.    Still complains of dry cough, no fevers, ear pain, sore throat. Somewhat improved by using her flonase and zyrtec. She is a former smoker.    The following portions of the patient's history were reviewed and updated as appropriate: current medications, allergies, PMH.    Review of Systems   Constitutional: Negative for appetite change, chills, fatigue, fever and unexpected weight change.   HENT: Negative for congestion, ear pain, hearing loss, nosebleeds, sinus pressure, sore throat, tinnitus and trouble swallowing.    Eyes: Negative for pain, discharge, redness, itching and visual disturbance.   Respiratory: Positive for cough. Negative for chest tightness, shortness of breath and wheezing.    Cardiovascular: Negative for chest pain, palpitations and leg swelling.   Gastrointestinal: Negative for abdominal pain, blood in stool, constipation, diarrhea, nausea and vomiting.   Endocrine: Negative for cold intolerance, heat intolerance, polydipsia, polyphagia and polyuria.   Genitourinary: Negative for decreased urine volume, dysuria, flank pain, frequency and hematuria.   Musculoskeletal: Negative for arthralgias, back pain, gait problem, joint swelling, myalgias, neck pain and neck stiffness.   Skin: Negative for color change and rash.   Allergic/Immunologic: Negative for environmental allergies, food allergies and immunocompromised state.   Neurological: Negative for dizziness, syncope, weakness, light-headedness and headaches.   Hematological: Negative for adenopathy. Does not bruise/bleed easily.  "  Psychiatric/Behavioral: Negative for dysphoric mood, sleep disturbance and suicidal ideas. The patient is not nervous/anxious.        Objective     Vitals:    12/07/18 1344 12/07/18 1401   BP: 146/78 134/72   Pulse: 77 68   Temp: 97.2 °F (36.2 °C)    SpO2: 95%    Weight: 70.3 kg (155 lb)    Height: 157.5 cm (62.01\")        Physical Exam   Constitutional: Appears well-developed and well-nourished.   Right Ear: Tympanic membrane and external ear normal.   Left Ear: Tympanic membrane and external ear normal.   Nose: Nose normal.   Mouth/Throat: Oropharynx is clear. Clear pnd noted  Eyes: EOM are normal. Pupils are equal, round, and reactive to light.   Neck: Normal range of motion. Neck supple.   Cardiovascular: Normal rate, regular rhythm.  Pulmonary/Chest: Effort normal and breath sounds normal.   Abdominal: Soft. Bowel sounds are normal.   Musculoskeletal: Normal range of motion.   Lymphadenopathy: No cervical adenopathy noted.   Neurological: Alert and oriented to person, place, and time.   Skin: Skin is warm and dry.   Psychiatric: Exhibits a normal mood and affect.     Assessment/Plan     Diagnoses and all orders for this visit:    Benign essential hypertension    Low salt diet encouraged. Exercise as tolerated.  Continue monitoring BP at home.    Mixed hyperlipidemia  -     pravastatin (PRAVACHOL) 80 MG tablet; Take 1 tablet by mouth Daily.    Seasonal allergies  -     fluticasone (FLONASE) 50 MCG/ACT nasal spray; 1 spray into the nostril(s) as directed by provider Daily.    Continue with xyzal qhs and flonase to help control cough.    Gastroesophageal reflux disease without esophagitis  Well controlled with prilosec.    Follow up 4 months with Dr. Vermeesch, due for labs at that time.    Alison Colmenares PA-C  12/07/2018         Please note that portions of this note were completed with a voice recognition program. Efforts were made to edit dictation, but occasionally words are mistranscribed.  "

## 2018-12-13 VITALS
OXYGEN SATURATION: 95 % | BODY MASS INDEX: 28.52 KG/M2 | DIASTOLIC BLOOD PRESSURE: 72 MMHG | HEIGHT: 62 IN | SYSTOLIC BLOOD PRESSURE: 134 MMHG | WEIGHT: 155 LBS | HEART RATE: 68 BPM | TEMPERATURE: 97.2 F

## 2018-12-13 RX ORDER — FLUTICASONE PROPIONATE 50 MCG
1 SPRAY, SUSPENSION (ML) NASAL DAILY
Qty: 16 ML | Refills: 5 | Status: SHIPPED | OUTPATIENT
Start: 2018-12-13 | End: 2019-04-29 | Stop reason: SDUPTHER

## 2018-12-13 RX ORDER — PRAVASTATIN SODIUM 80 MG/1
80 TABLET ORAL DAILY
Qty: 90 TABLET | Refills: 1 | Status: SHIPPED | OUTPATIENT
Start: 2018-12-13 | End: 2019-07-08 | Stop reason: SDUPTHER

## 2019-01-22 ENCOUNTER — OFFICE VISIT (OUTPATIENT)
Dept: INTERNAL MEDICINE | Facility: CLINIC | Age: 79
End: 2019-01-22

## 2019-01-22 ENCOUNTER — HOSPITAL ENCOUNTER (OUTPATIENT)
Dept: GENERAL RADIOLOGY | Facility: HOSPITAL | Age: 79
Discharge: HOME | End: 2019-01-22
Attending: INTERNAL MEDICINE | Admitting: INTERNAL MEDICINE

## 2019-01-22 VITALS
OXYGEN SATURATION: 93 % | BODY MASS INDEX: 28.34 KG/M2 | TEMPERATURE: 98.5 F | HEART RATE: 70 BPM | DIASTOLIC BLOOD PRESSURE: 82 MMHG | WEIGHT: 154 LBS | HEIGHT: 62 IN | SYSTOLIC BLOOD PRESSURE: 132 MMHG

## 2019-01-22 DIAGNOSIS — K21.9 GASTROESOPHAGEAL REFLUX DISEASE WITHOUT ESOPHAGITIS: ICD-10-CM

## 2019-01-22 DIAGNOSIS — J30.2 SEASONAL ALLERGIES: ICD-10-CM

## 2019-01-22 DIAGNOSIS — L98.9 SKIN LESION OF CHEEK: ICD-10-CM

## 2019-01-22 DIAGNOSIS — R05.3 CHRONIC COUGH: Primary | ICD-10-CM

## 2019-01-22 DIAGNOSIS — R05.3 CHRONIC COUGH: ICD-10-CM

## 2019-01-22 PROCEDURE — 71046 X-RAY EXAM CHEST 2 VIEWS: CPT

## 2019-01-22 PROCEDURE — 99214 OFFICE O/P EST MOD 30 MIN: CPT | Performed by: INTERNAL MEDICINE

## 2019-01-22 NOTE — PROGRESS NOTES
"Chief Complaint   Patient presents with   • Abstract     Pt states she's had a cough for a long time.      Subjective   Teena Coe is a 78 y.o. female.     Here today with complaints of a chronic dry cough.  Patient is not on an ACE inhibitor.  Patient states that she is compliant with Flonase about 5 nights a week.  She has not been taking any antihistamine recently.  She has a known history of GERD and takes Prilosec as well as Zantac daily.  Patient is a former smoker and quit approximately 30 yrs ago.  She smoked about 25 yrs. She has an albuterol inhaler that she uses when she goes outside on occasion.  She does not cough at night.  Her cough is worse in the day, usually some loose cough.  She does feel SOA and does wheeze on occasion.   She denies any CP, palpitations or edema.     She complains of skin lesion on her right cheek for about 6 mo or longer.      History obtained from patient.  She is a poor historian.         The following portions of the patient's history were reviewed and updated as appropriate: allergies, current medications, past family history, past medical history, past social history, past surgical history and problem list.    Review of Systems   Constitutional: Negative for activity change, appetite change, chills, fever and unexpected weight change.   HENT: Positive for congestion and postnasal drip.    Respiratory: Positive for cough, shortness of breath and wheezing.    Cardiovascular: Negative for chest pain, palpitations and leg swelling.   Skin:        Skin lesion on right cheek   Allergic/Immunologic: Positive for environmental allergies.   All other systems reviewed and are negative.      Objective   /82   Pulse 70   Temp 98.5 °F (36.9 °C)   Ht 157.5 cm (62\")   Wt 69.9 kg (154 lb)   SpO2 93%   BMI 28.17 kg/m²   Body mass index is 28.17 kg/m².  Physical Exam   Constitutional: She is oriented to person, place, and time. She appears well-developed and well-nourished. " No distress.   Pleasant female in no distress today   HENT:   Head: Normocephalic and atraumatic.   Right Ear: External ear normal.   Left Ear: External ear normal.   Mouth/Throat: Oropharynx is clear and moist.   Tympanic membranes normal, turbinates normal, small amount of clear postnasal drip noted   Eyes: Conjunctivae and EOM are normal. Pupils are equal, round, and reactive to light.   Neck: Normal range of motion. Neck supple. No thyromegaly present.   Cardiovascular: Normal rate, regular rhythm and intact distal pulses.   Murmur heard.  Systolic murmur grade 2/6 heard over precordium   Pulmonary/Chest: Effort normal. No respiratory distress. She has no wheezes.   Slightly diminished breath sounds, but no rhonchi or wheezes noted   Abdominal: Soft. Bowel sounds are normal. She exhibits no distension. There is no tenderness.   Musculoskeletal: Normal range of motion. She exhibits no edema.   Lymphadenopathy:     She has no cervical adenopathy.   Neurological: She is alert and oriented to person, place, and time. No cranial nerve deficit. Coordination normal.   Skin:   Right cheek inferior to I with approximate 6 mm brown nodular skin lesion noted   Psychiatric: She has a normal mood and affect. Her behavior is normal. Judgment and thought content normal.   Nursing note and vitals reviewed.      Assessment/Plan   Teena Coe is here today and the following problems have been addressed:      Teena was seen today for abstract.    Diagnoses and all orders for this visit:    Chronic cough  -     XR Chest 2 View; Future  -     Ambulatory Referral to Pulmonology    Gastroesophageal reflux disease without esophagitis  -     XR Chest 2 View; Future    Seasonal allergies  -     XR Chest 2 View; Future    Skin lesion of cheek  -     Ambulatory Referral to Dermatology    XR chest ordered  Refer to pulmonary for cough and probable COPD  Patient may continue to use albuterol when necessary  No samples available in clinic to  provide patient today for underlying possible COPD  Refer to dermatologist for right cheek skin lesion  Take omeprazole in AM on empty stomach and zantac in PM  Take flonase every night      RTC as scheduled or prn    Please note that portions of this note were completed with a voice recognition program.  Efforts were made to edit dictation, but occasionally words are mistranscribed.

## 2019-01-24 DIAGNOSIS — R05.3 CHRONIC COUGH: Primary | ICD-10-CM

## 2019-01-24 NOTE — PROGRESS NOTES
Please call patient and tell her that x-ray reveals a fullness in her right lung with a small amount of fluid noted at base.  I have ordered a CAT scan of her lungs.  She will be contacted with the appointment time at the hospital.

## 2019-02-05 NOTE — DISCHARGE INSTRUCTIONS
Patient instructed on PAT PASS information.  Patient/family member verbalized understanding of instruction/education.  
negative...

## 2019-03-06 ENCOUNTER — OFFICE VISIT (OUTPATIENT)
Dept: PULMONOLOGY | Facility: CLINIC | Age: 79
End: 2019-03-06

## 2019-03-06 VITALS
HEIGHT: 62 IN | DIASTOLIC BLOOD PRESSURE: 70 MMHG | SYSTOLIC BLOOD PRESSURE: 140 MMHG | RESPIRATION RATE: 18 BRPM | WEIGHT: 155 LBS | HEART RATE: 75 BPM | BODY MASS INDEX: 28.52 KG/M2 | OXYGEN SATURATION: 94 %

## 2019-03-06 DIAGNOSIS — R06.02 SHORTNESS OF BREATH: ICD-10-CM

## 2019-03-06 DIAGNOSIS — R91.8 ABNORMAL X-RAY OF LUNG: Primary | ICD-10-CM

## 2019-03-06 DIAGNOSIS — R05.3 COUGH, PERSISTENT: ICD-10-CM

## 2019-03-06 DIAGNOSIS — J30.89 OTHER ALLERGIC RHINITIS: ICD-10-CM

## 2019-03-06 PROCEDURE — 99204 OFFICE O/P NEW MOD 45 MIN: CPT | Performed by: INTERNAL MEDICINE

## 2019-03-06 NOTE — PROGRESS NOTES
CONSULT NOTE    Requested by:   Vermeesch, Marilyn K, MD      Chief Complaint   Patient presents with   • Consult   • Cough       Subjective:  Teena Coe is a 79 y.o. female.     History of Present Illness   Patient was sent in for referral for cough.    The patient says that she started having a cough a few months ago and although she has had bronchitis in the past, this time the cough lasted for a long time and she had been given treatment by her primary care provider with only minimal improvement.  It was only recently when her cough started improving, especially after she started using the nasal sprays, as advised by her primary care provider.    she does report Runny nose, dribbling in the back of her throat occasionally.    She was diagnosed with tuberculosis as a child and remembers spending 1 year in the hospital.  She was eventually released and says that since then she is not had any symptoms whatsoever.  She was told at that time, that her tuberculosis was cured.    She used to smoke 1-2 packs/day for about 20 years, having quit 35 years ago.    The following portions of the patient's history were reviewed and updated as appropriate: allergies, current medications, past family history, past medical history, past social history and past surgical history.    Review of Systems   Constitutional: Negative for chills, fatigue and fever.   HENT: Positive for rhinorrhea and sneezing. Negative for sinus pressure.    Respiratory: Positive for cough and wheezing. Negative for chest tightness and shortness of breath.    Cardiovascular: Positive for leg swelling. Negative for chest pain and palpitations.   Psychiatric/Behavioral: Negative for sleep disturbance.   All other systems reviewed and are negative.      Past Medical History:   Diagnosis Date   • Arthritis    • Disease of thyroid gland    • Emphysema of lung (CMS/HCC)    • Full dentures    • GERD (gastroesophageal reflux disease)    • Heart murmur    •  "History of cataract    • History of TB (tuberculosis)     As a child.  Pt cannot recall treatment recieved.   • Hyperlipidemia    • Hypertension    • Hypothyroid    • Wears glasses        Social History     Tobacco Use   • Smoking status: Former Smoker     Packs/day: 1.00     Types: Cigarettes   • Smokeless tobacco: Never Used   • Tobacco comment: QUIT 30 YEARS AGO   Substance Use Topics   • Alcohol use: No         Objective:  Visit Vitals  /70   Pulse 75   Resp 18   Ht 157.5 cm (62.01\")   Wt 70.3 kg (155 lb)   SpO2 94%   BMI 28.34 kg/m²       Physical Exam   Constitutional: She is oriented to person, place, and time. She appears well-developed and well-nourished.   HENT:   Head: Atraumatic.   Nostril showed moderate erythema.  Oropharynx was cobblestoned   Dentures.    Eyes: EOM are normal.   Neck: Neck supple. No JVD present. No thyromegaly present.   Cardiovascular: Normal rate and regular rhythm.   No murmur heard.  Pulmonary/Chest: Effort normal. No respiratory distress. She has no wheezes. She has no rales.   Somewhat hyperresonant to percussion.  Minimal scattered wheezing noted.   Musculoskeletal:   Gait was normal.   Neurological: She is alert and oriented to person, place, and time.   Skin: Skin is warm and dry.   Psychiatric: She has a normal mood and affect. Her behavior is normal.   Vitals reviewed.      Assessment/Plan:  Teena was seen today for consult and cough.    Diagnoses and all orders for this visit:    Abnormal x-ray of lung  -     CT Chest Without Contrast; Future  -     Pulmonary Function Test; Future  -     Nitric Oxide; Future    Cough, persistent  -     CT Chest Without Contrast; Future  -     Pulmonary Function Test; Future  -     Nitric Oxide; Future    Shortness of breath  -     CT Chest Without Contrast; Future  -     Pulmonary Function Test; Future  -     Nitric Oxide; Future    Other allergic rhinitis        Return in about 7 weeks (around 4/24/2019) for Recheck, Imaging study, " PFT on day of F/Up, FeNO on F/Up, For Radha.    DISCUSSION(if any):  I have reviewed the patient's imaging studies and shared them with her.  The last chest x-ray from 22 January showed some chronic changes although no acute abnormality was noted.  Most of the abnormalities were focused around the left lung.    Laboratory workup was also reviewed which showed her last Carbon dioxide level of 30 & hemoglobin was normal at 14.  Her absolute interval count has ranged between 250-360 recently.      ===========================  ===========================    Patient was advised to continue her nasal spray, especially given improvement in symptoms overall.    Since the patient's chest x-ray showed unilateral abnormalities and she has persistent cough over the past few months, combined with her history of tuberculosis, a full CT of the chest is definitely warranted and this has been ordered.    PFTs and assessment of nitric oxide levels will be obtained upon follow-up visit.    I told the patient to call this office if her cough worsens despite using the nasal sprays or if she has any hemoptysis, weight loss or night sweats etc.    Dictated utilizing Dragon dictation.    This document was electronically signed by Ayan Porter MD on 03/06/19 at 10:11 AM

## 2019-03-08 DIAGNOSIS — K21.9 GASTROESOPHAGEAL REFLUX DISEASE WITHOUT ESOPHAGITIS: ICD-10-CM

## 2019-03-08 RX ORDER — RANITIDINE 150 MG/1
CAPSULE ORAL
Qty: 180 CAPSULE | Refills: 3 | Status: SHIPPED | OUTPATIENT
Start: 2019-03-08 | End: 2019-03-11 | Stop reason: SDUPTHER

## 2019-03-11 DIAGNOSIS — K21.9 GASTROESOPHAGEAL REFLUX DISEASE WITHOUT ESOPHAGITIS: ICD-10-CM

## 2019-03-11 RX ORDER — RANITIDINE 150 MG/1
150 CAPSULE ORAL 2 TIMES DAILY
Qty: 180 CAPSULE | Refills: 3 | Status: SHIPPED | OUTPATIENT
Start: 2019-03-11 | End: 2019-08-09 | Stop reason: SDUPTHER

## 2019-03-11 NOTE — TELEPHONE ENCOUNTER
Patient needs refills she is out of Ranitidine. Please send to [facility] listed. Thanks

## 2019-03-12 RX ORDER — HYDROCHLOROTHIAZIDE 12.5 MG/1
CAPSULE, GELATIN COATED ORAL
Qty: 180 CAPSULE | Refills: 1 | Status: SHIPPED | OUTPATIENT
Start: 2019-03-12 | End: 2019-08-09 | Stop reason: SDUPTHER

## 2019-03-12 RX ORDER — LOSARTAN POTASSIUM 50 MG/1
TABLET ORAL
Qty: 90 TABLET | Refills: 1 | Status: SHIPPED | OUTPATIENT
Start: 2019-03-12 | End: 2019-08-14 | Stop reason: SDUPTHER

## 2019-03-13 ENCOUNTER — HOSPITAL ENCOUNTER (OUTPATIENT)
Dept: CT IMAGING | Facility: HOSPITAL | Age: 79
Discharge: HOME | End: 2019-03-13
Admitting: INTERNAL MEDICINE

## 2019-03-13 DIAGNOSIS — R06.02 SHORTNESS OF BREATH: ICD-10-CM

## 2019-03-13 DIAGNOSIS — R91.8 ABNORMAL X-RAY OF LUNG: ICD-10-CM

## 2019-03-13 DIAGNOSIS — R05.3 COUGH, PERSISTENT: ICD-10-CM

## 2019-03-13 PROCEDURE — 71250 CT THORAX DX C-: CPT

## 2019-04-08 ENCOUNTER — OFFICE VISIT (OUTPATIENT)
Dept: INTERNAL MEDICINE | Facility: CLINIC | Age: 79
End: 2019-04-08

## 2019-04-08 VITALS
WEIGHT: 155 LBS | HEIGHT: 62 IN | BODY MASS INDEX: 28.52 KG/M2 | TEMPERATURE: 97.5 F | DIASTOLIC BLOOD PRESSURE: 80 MMHG | HEART RATE: 60 BPM | OXYGEN SATURATION: 96 % | SYSTOLIC BLOOD PRESSURE: 142 MMHG

## 2019-04-08 DIAGNOSIS — E78.2 MIXED HYPERLIPIDEMIA: ICD-10-CM

## 2019-04-08 DIAGNOSIS — K21.9 GASTROESOPHAGEAL REFLUX DISEASE WITHOUT ESOPHAGITIS: ICD-10-CM

## 2019-04-08 DIAGNOSIS — I10 BENIGN ESSENTIAL HYPERTENSION: Primary | ICD-10-CM

## 2019-04-08 DIAGNOSIS — E03.8 OTHER SPECIFIED HYPOTHYROIDISM: ICD-10-CM

## 2019-04-08 DIAGNOSIS — E83.52 HYPERCALCEMIA: Primary | ICD-10-CM

## 2019-04-08 LAB
ALBUMIN SERPL-MCNC: 4.2 G/DL (ref 3.5–5)
ALBUMIN/GLOB SERPL: 1.6 G/DL (ref 1–2)
ALP SERPL-CCNC: 54 U/L (ref 38–126)
ALT SERPL-CCNC: 24 U/L (ref 13–69)
AST SERPL-CCNC: 25 U/L (ref 15–46)
BASOPHILS # BLD AUTO: 0.05 10*3/MM3 (ref 0–0.2)
BASOPHILS NFR BLD AUTO: 0.8 % (ref 0–1.5)
BILIRUB SERPL-MCNC: 0.6 MG/DL (ref 0.2–1.3)
BUN SERPL-MCNC: 19 MG/DL (ref 7–20)
BUN/CREAT SERPL: 17.3 (ref 7.1–23.5)
CALCIUM SERPL-MCNC: 11 MG/DL (ref 8.4–10.2)
CHLORIDE SERPL-SCNC: 94 MMOL/L (ref 98–107)
CHOLEST SERPL-MCNC: 157 MG/DL (ref 0–199)
CHOLEST/HDLC SERPL: 2.49 {RATIO}
CO2 SERPL-SCNC: 32 MMOL/L (ref 26–30)
CREAT SERPL-MCNC: 1.1 MG/DL (ref 0.6–1.3)
EOSINOPHIL # BLD AUTO: 0.3 10*3/MM3 (ref 0–0.4)
EOSINOPHIL NFR BLD AUTO: 4.6 % (ref 0.3–6.2)
ERYTHROCYTE [DISTWIDTH] IN BLOOD BY AUTOMATED COUNT: 12.7 % (ref 12.3–15.4)
GLOBULIN SER CALC-MCNC: 2.7 GM/DL
GLUCOSE SERPL-MCNC: 93 MG/DL (ref 74–98)
HCT VFR BLD AUTO: 41.4 % (ref 34–46.6)
HDLC SERPL-MCNC: 63 MG/DL (ref 40–60)
HGB BLD-MCNC: 13.8 G/DL (ref 12–15.9)
IMM GRANULOCYTES # BLD AUTO: 0.01 10*3/MM3 (ref 0–0.05)
IMM GRANULOCYTES NFR BLD AUTO: 0.2 % (ref 0–0.5)
LDLC SERPL CALC-MCNC: 65 MG/DL (ref 0–99)
LYMPHOCYTES # BLD AUTO: 2.17 10*3/MM3 (ref 0.7–3.1)
LYMPHOCYTES NFR BLD AUTO: 33.1 % (ref 19.6–45.3)
MCH RBC QN AUTO: 30.9 PG (ref 26.6–33)
MCHC RBC AUTO-ENTMCNC: 33.3 G/DL (ref 31.5–35.7)
MCV RBC AUTO: 92.8 FL (ref 79–97)
MONOCYTES # BLD AUTO: 0.67 10*3/MM3 (ref 0.1–0.9)
MONOCYTES NFR BLD AUTO: 10.2 % (ref 5–12)
NEUTROPHILS # BLD AUTO: 3.36 10*3/MM3 (ref 1.4–7)
NEUTROPHILS NFR BLD AUTO: 51.1 % (ref 42.7–76)
NRBC BLD AUTO-RTO: 0 /100 WBC (ref 0–0)
PLATELET # BLD AUTO: 239 10*3/MM3 (ref 140–450)
POTASSIUM SERPL-SCNC: 4.2 MMOL/L (ref 3.5–5.1)
PROT SERPL-MCNC: 6.9 G/DL (ref 6.3–8.2)
RBC # BLD AUTO: 4.46 10*6/MM3 (ref 3.77–5.28)
SODIUM SERPL-SCNC: 136 MMOL/L (ref 137–145)
T4 FREE SERPL-MCNC: 1.96 NG/DL (ref 0.78–2.19)
TRIGL SERPL-MCNC: 145 MG/DL
TSH SERPL DL<=0.005 MIU/L-ACNC: 0.62 MIU/ML (ref 0.47–4.68)
VLDLC SERPL CALC-MCNC: 29 MG/DL
WBC # BLD AUTO: 6.56 10*3/MM3 (ref 3.4–10.8)

## 2019-04-08 PROCEDURE — 99214 OFFICE O/P EST MOD 30 MIN: CPT | Performed by: INTERNAL MEDICINE

## 2019-04-08 NOTE — PROGRESS NOTES
Please call patient or  with lab results.  Complete blood count is normal.  Thyroid function tests are normal.  Cholesterol panel is in normal range.  Kidney function is normal.  Liver function is normal.  Her calcium level is elevated, this may be due to use of hydrochlorothiazide.  I have ordered a test to check for other possible cause and would like her to come in downstairs to lab for another blood test, she does not need to be fasting and may come in at any time to have this done.

## 2019-04-08 NOTE — PROGRESS NOTES
"Chief Complaint   Patient presents with   • Follow-up     4 months for HTN, GERD, HLD, and hypothyroidism. No new complaints.      Subjective   Teena Coe is a 79 y.o. female.     Here for follow up of HTN, HLD, hypothyroid, GERD, vitD def, allergies.  HTN/HLD- Denies CP, SOB, some occasional edema.  BP is elevated today.  She takes her HCTZ in AM and losartan in AM  Hypothyroid- takes her thyroid med daily. No difficulty swallowing or change in voice  GERD- she denies any GERD sxs.  Takes her prilosec daily.   Vit D def- she takes her vit D daily.  Cough- she was seen by Dr Porter,  Was told to use flonase and zyrtec.  CT chest reveals scarring, but no other abnormalities.  She feels her cough is better with using the flonase and zyrtec.   HCM- she has not had the Hep A or shingles vaccine.  She may consider still having a mammogram, she will order it herself.          The following portions of the patient's history were reviewed and updated as appropriate: allergies, current medications, past family history, past medical history, past social history, past surgical history and problem list.    Review of Systems   Constitutional: Negative for activity change, appetite change and unexpected weight change.   HENT: Positive for postnasal drip.    Eyes: Negative for visual disturbance.   Respiratory: Negative for cough, shortness of breath and wheezing.    Cardiovascular: Positive for leg swelling. Negative for chest pain and palpitations.   Gastrointestinal: Negative for abdominal pain.   Allergic/Immunologic: Positive for environmental allergies.   Neurological: Negative for headaches.   Psychiatric/Behavioral: Negative for dysphoric mood and sleep disturbance. The patient is not nervous/anxious.    All other systems reviewed and are negative.      Objective   /80   Pulse 60   Temp 97.5 °F (36.4 °C)   Ht 157.5 cm (62.01\")   Wt 70.3 kg (155 lb)   SpO2 96%   BMI 28.34 kg/m²   Body mass index is 28.34 " kg/m².  Physical Exam   Constitutional: She is oriented to person, place, and time. She appears well-developed and well-nourished. No distress.   Pleasant female in no distress today   HENT:   Head: Normocephalic and atraumatic.   Right Ear: External ear normal.   Left Ear: External ear normal.   Mouth/Throat: Oropharynx is clear and moist.   Clear postnasal drip noted   Eyes: Conjunctivae and EOM are normal. Pupils are equal, round, and reactive to light.   Neck: Normal range of motion. Neck supple. No thyromegaly present.   Cardiovascular: Normal rate, regular rhythm, normal heart sounds and intact distal pulses.   No murmur heard.  No carotid bruits   Pulmonary/Chest: Effort normal and breath sounds normal. No respiratory distress. She has no wheezes.   Abdominal: Soft. Bowel sounds are normal. She exhibits no distension. There is no tenderness.   Musculoskeletal: Normal range of motion. She exhibits no edema.   Lymphadenopathy:     She has no cervical adenopathy.   Neurological: She is alert and oriented to person, place, and time. No cranial nerve deficit. Coordination normal.   Psychiatric: She has a normal mood and affect. Her behavior is normal. Judgment and thought content normal.   Nursing note and vitals reviewed.      Assessment/Plan   Teena Coe is here today and the following problems have been addressed:      Teena was seen today for follow-up.    Diagnoses and all orders for this visit:    Benign essential hypertension  -     CBC & Differential  -     Comprehensive Metabolic Panel  -     Lipid Panel With / Chol / HDL Ratio    Mixed hyperlipidemia  -     Comprehensive Metabolic Panel  -     Lipid Panel With / Chol / HDL Ratio    Gastroesophageal reflux disease without esophagitis    Other specified hypothyroidism  -     T4, Free  -     TSH        Follow heart healthy/low salt diet  Avoid processed foods  Monitor blood pressure as discussed, bring reads to clinic in about 2 weeks  Exercise as  tolerated up to 150 minutes per week  Take all medications as prescribed  Labs as noted  Continue flonase and zyrtec daily for allergy symptoms and prevention of cough  Copy of living will brought in for chart today  Patient wants to continue mammogram screening, she will schedule her own mammogram      Return in about 4 months (around 8/8/2019) for Next scheduled follow up.      Marilyn K. Vermeesch, MD      Please note that portions of this note were completed with a voice recognition program.  Efforts were made to edit dictation, but occasionally words are mistranscribed.

## 2019-04-11 LAB
CALCIUM SERPL-MCNC: 10.3 MG/DL (ref 8.7–10.3)
INTACT PTH: NORMAL
PTH-INTACT SERPL-MCNC: 47 PG/ML (ref 15–65)

## 2019-04-29 ENCOUNTER — OFFICE VISIT (OUTPATIENT)
Dept: PULMONOLOGY | Facility: CLINIC | Age: 79
End: 2019-04-29

## 2019-04-29 VITALS
HEIGHT: 62 IN | WEIGHT: 154 LBS | DIASTOLIC BLOOD PRESSURE: 68 MMHG | RESPIRATION RATE: 18 BRPM | HEART RATE: 73 BPM | OXYGEN SATURATION: 97 % | SYSTOLIC BLOOD PRESSURE: 106 MMHG | BODY MASS INDEX: 28.34 KG/M2

## 2019-04-29 DIAGNOSIS — R91.8 ABNORMAL X-RAY OF LUNG: ICD-10-CM

## 2019-04-29 DIAGNOSIS — R09.02 EXERCISE HYPOXEMIA: ICD-10-CM

## 2019-04-29 DIAGNOSIS — R05.3 COUGH, PERSISTENT: Primary | ICD-10-CM

## 2019-04-29 DIAGNOSIS — J30.89 OTHER ALLERGIC RHINITIS: ICD-10-CM

## 2019-04-29 DIAGNOSIS — J44.9 CHRONIC OBSTRUCTIVE PULMONARY DISEASE, UNSPECIFIED COPD TYPE (HCC): ICD-10-CM

## 2019-04-29 DIAGNOSIS — R06.02 SHORTNESS OF BREATH: ICD-10-CM

## 2019-04-29 DIAGNOSIS — J30.2 SEASONAL ALLERGIES: ICD-10-CM

## 2019-04-29 DIAGNOSIS — R05.3 COUGH, PERSISTENT: ICD-10-CM

## 2019-04-29 PROCEDURE — 94618 PULMONARY STRESS TESTING: CPT | Performed by: NURSE PRACTITIONER

## 2019-04-29 PROCEDURE — 94726 PLETHYSMOGRAPHY LUNG VOLUMES: CPT | Performed by: INTERNAL MEDICINE

## 2019-04-29 PROCEDURE — 94060 EVALUATION OF WHEEZING: CPT | Performed by: INTERNAL MEDICINE

## 2019-04-29 PROCEDURE — 99214 OFFICE O/P EST MOD 30 MIN: CPT | Performed by: NURSE PRACTITIONER

## 2019-04-29 PROCEDURE — 94729 DIFFUSING CAPACITY: CPT | Performed by: INTERNAL MEDICINE

## 2019-04-29 RX ORDER — ALBUTEROL SULFATE 90 UG/1
2 AEROSOL, METERED RESPIRATORY (INHALATION) EVERY 6 HOURS PRN
Qty: 1 INHALER | Refills: 3 | Status: SHIPPED | OUTPATIENT
Start: 2019-04-29

## 2019-04-29 RX ORDER — FLUTICASONE PROPIONATE 50 MCG
1 SPRAY, SUSPENSION (ML) NASAL DAILY
Qty: 16 ML | Refills: 5 | Status: SHIPPED | OUTPATIENT
Start: 2019-04-29 | End: 2019-08-27 | Stop reason: SDUPTHER

## 2019-05-14 ENCOUNTER — TRANSCRIBE ORDERS (OUTPATIENT)
Dept: ADMINISTRATIVE | Facility: HOSPITAL | Age: 79
End: 2019-05-14

## 2019-05-14 DIAGNOSIS — Z12.31 ENCOUNTER FOR SCREENING MAMMOGRAM FOR MALIGNANT NEOPLASM OF BREAST: Primary | ICD-10-CM

## 2019-07-08 DIAGNOSIS — E78.2 MIXED HYPERLIPIDEMIA: ICD-10-CM

## 2019-07-08 RX ORDER — PRAVASTATIN SODIUM 80 MG/1
TABLET ORAL
Qty: 90 TABLET | Refills: 0 | Status: SHIPPED | OUTPATIENT
Start: 2019-07-08 | End: 2019-08-09 | Stop reason: SDUPTHER

## 2019-08-05 RX ORDER — LEVOTHYROXINE SODIUM 0.1 MG/1
TABLET ORAL
Qty: 90 TABLET | Refills: 2 | Status: SHIPPED | OUTPATIENT
Start: 2019-08-05 | End: 2019-08-09 | Stop reason: SDUPTHER

## 2019-08-08 ENCOUNTER — OFFICE VISIT (OUTPATIENT)
Dept: INTERNAL MEDICINE | Facility: CLINIC | Age: 79
End: 2019-08-08

## 2019-08-08 VITALS
DIASTOLIC BLOOD PRESSURE: 72 MMHG | BODY MASS INDEX: 29.81 KG/M2 | SYSTOLIC BLOOD PRESSURE: 138 MMHG | HEIGHT: 62 IN | WEIGHT: 162 LBS | OXYGEN SATURATION: 94 % | HEART RATE: 68 BPM | TEMPERATURE: 97.8 F

## 2019-08-08 DIAGNOSIS — E03.8 OTHER SPECIFIED HYPOTHYROIDISM: ICD-10-CM

## 2019-08-08 DIAGNOSIS — K21.9 GASTROESOPHAGEAL REFLUX DISEASE WITHOUT ESOPHAGITIS: ICD-10-CM

## 2019-08-08 DIAGNOSIS — I10 BENIGN ESSENTIAL HYPERTENSION: ICD-10-CM

## 2019-08-08 DIAGNOSIS — E78.2 MIXED HYPERLIPIDEMIA: ICD-10-CM

## 2019-08-08 DIAGNOSIS — I25.10 CHRONIC CORONARY ARTERY DISEASE: ICD-10-CM

## 2019-08-08 DIAGNOSIS — J43.2 CENTRILOBULAR EMPHYSEMA (HCC): ICD-10-CM

## 2019-08-08 DIAGNOSIS — Z00.00 ENCOUNTER FOR MEDICARE ANNUAL WELLNESS EXAM: Primary | ICD-10-CM

## 2019-08-08 PROBLEM — L98.9 SKIN LESION OF CHEEK: Status: RESOLVED | Noted: 2019-01-22 | Resolved: 2019-08-08

## 2019-08-08 PROBLEM — R05.3 CHRONIC COUGH: Status: RESOLVED | Noted: 2019-01-22 | Resolved: 2019-08-08

## 2019-08-08 PROCEDURE — 96160 PT-FOCUSED HLTH RISK ASSMT: CPT | Performed by: INTERNAL MEDICINE

## 2019-08-08 PROCEDURE — G0439 PPPS, SUBSEQ VISIT: HCPCS | Performed by: INTERNAL MEDICINE

## 2019-08-08 PROCEDURE — 99397 PER PM REEVAL EST PAT 65+ YR: CPT | Performed by: INTERNAL MEDICINE

## 2019-08-08 NOTE — PROGRESS NOTES
The ABCs of the Annual Wellness Visit  Subsequent Medicare Wellness Visit    Chief Complaint   Patient presents with   • Medicare Wellness-subsequent       Subjective   History of Present Illness:  Teena Coe is a 79 y.o. female who presents for a Subsequent Medicare Wellness Visit. PMH of HTN, CAD, HLD, GERD, hypothyroid, allergies and COPD. Her BP is well controlled today, it is about the same at home.  No CP, palpitations or edema.  She takes her thyroid med daily before all other meds.   No GERD sxs.  Her allergies have been bothersome on and off this yr she is using her flonase regularly.  She has been diagnosed with COPD and is using anoro, it has helped a lot and she can walk further without feeling as SOA now.     HEALTH RISK ASSESSMENT    Recent Hospitalizations:  No hospitalization(s) within the last year.    Current Medical Providers:  Patient Care Team:  Vermeesch, Marilyn K, MD as PCP - General  Vermeesch, Marilyn K, MD as PCP - Family Medicine    Smoking Status:  Social History     Tobacco Use   Smoking Status Former Smoker   • Packs/day: 1.00   • Types: Cigarettes   Smokeless Tobacco Never Used   Tobacco Comment    QUIT 30 YEARS AGO       Alcohol Consumption:  Social History     Substance and Sexual Activity   Alcohol Use No       Depression Screen:   PHQ-2/PHQ-9 Depression Screening 8/8/2019   Little interest or pleasure in doing things 0   Feeling down, depressed, or hopeless 0   Total Score 0       Fall Risk Screen:  STEADI Fall Risk Assessment was completed, and patient is at LOW risk for falls.Assessment completed on:8/8/2019    Health Habits and Functional and Cognitive Screening:  Functional & Cognitive Status 8/8/2019   Do you have difficulty preparing food and eating? No   Do you have difficulty bathing yourself, getting dressed or grooming yourself? No   Do you have difficulty using the toilet? No   Do you have difficulty moving around from place to place? No   Do you have trouble with  steps or getting out of a bed or a chair? No   Current Diet Limited Junk Food   Dental Exam Not up to date   Eye Exam Up to date   Exercise (times per week) 5 times per week   Current Exercise Activities Include Walking   Do you need help using the phone?  No   Are you deaf or do you have serious difficulty hearing?  No   Do you need help with transportation? Yes   Do you need help shopping? No   Do you need help preparing meals?  No   Do you need help with housework?  No   Do you need help with laundry? No   Do you need help taking your medications? No   Do you need help managing money? No   Do you ever drive or ride in a car without wearing a seat belt? No   Have you felt unusual stress, anger or loneliness in the last month? No   Who do you live with? Spouse   If you need help, do you have trouble finding someone available to you? No   Do you have difficulty concentrating, remembering or making decisions? No         Does the patient have evidence of cognitive impairment? No    Asprin use counseling:Taking ASA appropriately as indicated    Age-appropriate Screening Schedule:  Refer to the list below for future screening recommendations based on patient's age, sex and/or medical conditions. Orders for these recommended tests are listed in the plan section. The patient has been provided with a written plan.    Health Maintenance   Topic Date Due   • INFLUENZA VACCINE  08/01/2019   • LIPID PANEL  04/08/2020   • MAMMOGRAM  04/19/2020   • TDAP/TD VACCINES (2 - Td) 03/31/2027   • PNEUMOCOCCAL VACCINES (65+ LOW/MEDIUM RISK)  Completed   • ZOSTER VACCINE  Discontinued          The following portions of the patient's history were reviewed and updated as appropriate: allergies, current medications, past family history, past medical history, past social history, past surgical history and problem list.    Outpatient Medications Prior to Visit   Medication Sig Dispense Refill   • albuterol sulfate HFA (VENTOLIN HFA) 108 (90  Base) MCG/ACT inhaler Inhale 2 puffs Every 6 (Six) Hours As Needed for Wheezing or Shortness of Air. for wheezing 1 inhaler 3   • aspirin 81 MG tablet Take  by mouth Daily.     • Cholecalciferol (VITAMIN D PO) Take  by mouth Daily.     • Cyanocobalamin (VITAMIN B-12 CR PO) Take  by mouth Daily.     • fluticasone (FLONASE) 50 MCG/ACT nasal spray 1 spray into the nostril(s) as directed by provider Daily. 16 mL 5   • hydrochlorothiazide (MICROZIDE) 12.5 MG capsule TAKE 1 TO 2 CAPSULES DAILY FOR BLOOD PRESSURE 180 capsule 1   • levothyroxine (SYNTHROID, LEVOTHROID) 100 MCG tablet TAKE 1 TABLET EVERY DAY 90 tablet 2   • losartan (COZAAR) 50 MG tablet TAKE 1 TABLET EVERY DAY 90 tablet 1   • Multiple Vitamins-Minerals (CENTRUM ADULTS PO) Take  by mouth Daily.     • omeprazole (PriLOSEC) 20 MG capsule Take 20 mg by mouth Daily.     • pravastatin (PRAVACHOL) 80 MG tablet TAKE 1 TABLET EVERY DAY 90 tablet 0   • ranitidine (ZANTAC) 150 MG capsule Take 1 capsule by mouth 2 (Two) Times a Day. 180 capsule 3   • umeclidinium-vilanterol (ANORO ELLIPTA) 62.5-25 MCG/INH aerosol powder  inhaler Inhale 1 puff Daily. 1 each 5     No facility-administered medications prior to visit.        Patient Active Problem List   Diagnosis   • Benign essential hypertension   • Chronic coronary artery disease   • Edema   • Gastroesophageal reflux disease without esophagitis   • Heart murmur   • Hyperlipidemia   • Hypothyroidism   • Seasonal allergies   • Encounter for Medicare annual wellness exam   • Centrilobular emphysema (CMS/HCC)       Advanced Care Planning:  Patient has an advance directive - a copy has been provided and is visible in patient header    Review of Systems   Constitutional: Negative.    HENT: Positive for congestion and postnasal drip.    Eyes: Negative.    Respiratory: Negative.    Cardiovascular: Negative.    Gastrointestinal: Negative.    Endocrine: Negative.    Genitourinary: Negative.    Musculoskeletal: Negative.    Skin:  "Negative.    Allergic/Immunologic: Positive for environmental allergies.   Neurological: Negative.    Hematological: Negative.    Psychiatric/Behavioral: Negative.        Compared to one year ago, the patient feels her physical health is the same.  Compared to one year ago, the patient feels her mental health is the same.    Reviewed chart for potential of high risk medication in the elderly: yes  Reviewed chart for potential of harmful drug interactions in the elderly:yes    Objective         Vitals:    08/08/19 1312   BP: 138/72   Pulse: 68   Temp: 97.8 °F (36.6 °C)   SpO2: 94%   Weight: 73.5 kg (162 lb)   Height: 157.5 cm (62.01\")   PainSc: 0-No pain       Body mass index is 29.62 kg/m².  Discussed the patient's BMI with her. The BMI is above average; BMI management plan is completed.    Physical Exam   Constitutional: She is oriented to person, place, and time. She appears well-developed and well-nourished. No distress.   Very pleasant female who appears younger than her stated age, she is in no distress today   HENT:   Head: Normocephalic and atraumatic.   Right Ear: External ear normal.   Left Ear: External ear normal.   Mouth/Throat: Oropharynx is clear and moist.   Tympanic membranes normal, oropharynx clear, patient is edentulous   Eyes: Conjunctivae and EOM are normal. Pupils are equal, round, and reactive to light.   Neck: Normal range of motion. Neck supple. No thyromegaly present.   Cardiovascular: Normal rate, regular rhythm, normal heart sounds and intact distal pulses.   No murmur heard.  No carotid bruits   Pulmonary/Chest: Effort normal and breath sounds normal. No respiratory distress. She has no wheezes.   Abdominal: Soft. Bowel sounds are normal. She exhibits no distension. There is no tenderness.   Musculoskeletal: Normal range of motion. She exhibits no edema.   Lymphadenopathy:     She has no cervical adenopathy.   Neurological: She is alert and oriented to person, place, and time. No cranial " nerve deficit. Coordination normal.   Skin:   Skin is well tanned   Psychiatric: She has a normal mood and affect. Her behavior is normal. Judgment and thought content normal.   Nursing note and vitals reviewed.            Assessment/Plan   Medicare Risks and Personalized Health Plan  CMS Preventative Services Quick Reference  Cardiovascular risk  Immunizations Discussed/Encouraged (specific immunizations; Hepatitis A Vaccine/Series and Shingrix )  Inactivity/Sedentary  Obesity/Overweight     The above risks/problems have been discussed with the patient.  Pertinent information has been shared with the patient in the After Visit Summary.  Follow up plans and orders are seen below in the Assessment/Plan Section.    Diagnoses and all orders for this visit:    1. Encounter for Medicare annual wellness exam (Primary)  -     T4, Free  -     TSH    2. Benign essential hypertension    3. Chronic coronary artery disease    4. Mixed hyperlipidemia    5. Gastroesophageal reflux disease without esophagitis    6. Other specified hypothyroidism  -     T4, Free  -     TSH    7. Centrilobular emphysema (CMS/[facility])    Labs as noted  Recommend Hep A and shingles vaccines at local pharmacy  Follow heart healthy/low salt diet  Avoid processed foods  Monitor blood pressure as discussed  Exercise as tolerated up to 30 minutes 5 days per week  Take all medications as prescribed  Continue anoro inhaler, doing better overall with breathing  Her GERD is well controlled with use of omeprazole  No change in thyroid med unless needed based on labs      Follow Up:  Return in about 6 months (around 2/8/2020) for Next scheduled follow up.     An After Visit Summary and PPPS were given to the patient.

## 2019-08-09 DIAGNOSIS — E78.2 MIXED HYPERLIPIDEMIA: ICD-10-CM

## 2019-08-09 DIAGNOSIS — K21.9 GASTROESOPHAGEAL REFLUX DISEASE WITHOUT ESOPHAGITIS: ICD-10-CM

## 2019-08-09 LAB
T4 FREE SERPL-MCNC: 1.61 NG/DL (ref 0.93–1.7)
TSH SERPL DL<=0.005 MIU/L-ACNC: 0.72 MIU/ML (ref 0.27–4.2)

## 2019-08-09 RX ORDER — PRAVASTATIN SODIUM 80 MG/1
80 TABLET ORAL DAILY
Qty: 90 TABLET | Refills: 1 | Status: SHIPPED | OUTPATIENT
Start: 2019-08-09 | End: 2020-01-23

## 2019-08-09 RX ORDER — HYDROCHLOROTHIAZIDE 12.5 MG/1
CAPSULE, GELATIN COATED ORAL
Qty: 180 CAPSULE | Refills: 1 | Status: SHIPPED | OUTPATIENT
Start: 2019-08-09 | End: 2020-04-14

## 2019-08-09 RX ORDER — RANITIDINE 150 MG/1
150 CAPSULE ORAL 2 TIMES DAILY
Qty: 180 CAPSULE | Refills: 3 | Status: SHIPPED | OUTPATIENT
Start: 2019-08-09 | End: 2020-02-06

## 2019-08-09 RX ORDER — LEVOTHYROXINE SODIUM 0.1 MG/1
100 TABLET ORAL DAILY
Qty: 90 TABLET | Refills: 3 | Status: SHIPPED | OUTPATIENT
Start: 2019-08-09 | End: 2020-08-12

## 2019-08-09 NOTE — PROGRESS NOTES
Please tell patient or  her thyroid function tests are normal and if she needs a 1 year refill of medication you may do so.

## 2019-08-14 RX ORDER — LOSARTAN POTASSIUM 50 MG/1
TABLET ORAL
Qty: 90 TABLET | Refills: 0 | Status: SHIPPED | OUTPATIENT
Start: 2019-08-14 | End: 2019-10-22 | Stop reason: SDUPTHER

## 2019-08-27 ENCOUNTER — OFFICE VISIT (OUTPATIENT)
Dept: PULMONOLOGY | Facility: CLINIC | Age: 79
End: 2019-08-27

## 2019-08-27 VITALS
HEART RATE: 74 BPM | SYSTOLIC BLOOD PRESSURE: 130 MMHG | OXYGEN SATURATION: 95 % | DIASTOLIC BLOOD PRESSURE: 62 MMHG | RESPIRATION RATE: 17 BRPM | WEIGHT: 162 LBS | BODY MASS INDEX: 29.81 KG/M2 | HEIGHT: 62 IN

## 2019-08-27 DIAGNOSIS — R09.02 EXERCISE HYPOXEMIA: ICD-10-CM

## 2019-08-27 DIAGNOSIS — R06.02 SHORTNESS OF BREATH: Primary | ICD-10-CM

## 2019-08-27 DIAGNOSIS — J44.9 CHRONIC OBSTRUCTIVE PULMONARY DISEASE, UNSPECIFIED COPD TYPE (HCC): ICD-10-CM

## 2019-08-27 DIAGNOSIS — J30.89 OTHER ALLERGIC RHINITIS: ICD-10-CM

## 2019-08-27 PROCEDURE — 99214 OFFICE O/P EST MOD 30 MIN: CPT | Performed by: NURSE PRACTITIONER

## 2019-08-27 RX ORDER — FLUTICASONE PROPIONATE 50 MCG
1 SPRAY, SUSPENSION (ML) NASAL DAILY
Qty: 16 ML | Refills: 5 | Status: SHIPPED | OUTPATIENT
Start: 2019-08-27 | End: 2019-08-27

## 2019-08-27 RX ORDER — FLUTICASONE PROPIONATE 50 MCG
1 SPRAY, SUSPENSION (ML) NASAL DAILY
Qty: 1 BOTTLE | Refills: 5 | Status: SHIPPED | OUTPATIENT
Start: 2019-08-27 | End: 2020-08-14

## 2019-10-22 RX ORDER — LOSARTAN POTASSIUM 50 MG/1
TABLET ORAL
Qty: 90 TABLET | Refills: 3 | Status: SHIPPED | OUTPATIENT
Start: 2019-10-22 | End: 2020-10-01

## 2019-10-29 ENCOUNTER — TELEPHONE (OUTPATIENT)
Dept: INTERNAL MEDICINE | Facility: CLINIC | Age: 79
End: 2019-10-29

## 2019-10-29 RX ORDER — FAMOTIDINE 20 MG/1
20 TABLET, FILM COATED ORAL 2 TIMES DAILY PRN
Qty: 180 TABLET | Refills: 1 | Status: SHIPPED | OUTPATIENT
Start: 2019-10-29 | End: 2020-02-06 | Stop reason: SINTOL

## 2019-10-29 NOTE — TELEPHONE ENCOUNTER
Patient came into the office stating that the Ranitidine that she received from [facility] has been recalled. She would like a call to discuss what she can take in it's place.

## 2020-01-23 DIAGNOSIS — E78.2 MIXED HYPERLIPIDEMIA: ICD-10-CM

## 2020-01-23 RX ORDER — PRAVASTATIN SODIUM 80 MG/1
TABLET ORAL
Qty: 90 TABLET | Refills: 1 | Status: SHIPPED | OUTPATIENT
Start: 2020-01-23 | End: 2020-06-24

## 2020-02-06 ENCOUNTER — OFFICE VISIT (OUTPATIENT)
Dept: INTERNAL MEDICINE | Facility: CLINIC | Age: 80
End: 2020-02-06

## 2020-02-06 VITALS
BODY MASS INDEX: 30 KG/M2 | WEIGHT: 163 LBS | HEART RATE: 73 BPM | TEMPERATURE: 98.1 F | DIASTOLIC BLOOD PRESSURE: 62 MMHG | SYSTOLIC BLOOD PRESSURE: 122 MMHG | HEIGHT: 62 IN | OXYGEN SATURATION: 96 %

## 2020-02-06 DIAGNOSIS — E78.2 MIXED HYPERLIPIDEMIA: ICD-10-CM

## 2020-02-06 DIAGNOSIS — J43.2 CENTRILOBULAR EMPHYSEMA (HCC): ICD-10-CM

## 2020-02-06 DIAGNOSIS — R01.1 HEART MURMUR: ICD-10-CM

## 2020-02-06 DIAGNOSIS — I10 BENIGN ESSENTIAL HYPERTENSION: Primary | ICD-10-CM

## 2020-02-06 DIAGNOSIS — E03.8 OTHER SPECIFIED HYPOTHYROIDISM: ICD-10-CM

## 2020-02-06 DIAGNOSIS — K21.9 GASTROESOPHAGEAL REFLUX DISEASE WITHOUT ESOPHAGITIS: ICD-10-CM

## 2020-02-06 PROCEDURE — 99214 OFFICE O/P EST MOD 30 MIN: CPT | Performed by: INTERNAL MEDICINE

## 2020-02-06 NOTE — PROGRESS NOTES
"Chief Complaint   Patient presents with   • Follow-up     for HLD, GERD, HTN, and Hypothyroidism. Pt would like to discuss heartburn medication.     Subjective   Teena Coe is a 79 y.o. female.     Here for follow up of HTN, HLD, hypothyroid, GERD, vitD def, allergies.  HTN/HLD- Denies CP, SOB, some occasional edema.  BP is well controlled today.  She takes her HCTZ in AM and losartan in AM  Hypothyroid- takes her thyroid med daily with water at night. No difficulty swallowing or change in voice  GERD- she states she quit taking the omeprazole as it bothered her stomach.  She is not having any GERD  Vit D def- she takes her vit D daily.  She is also taking vit B12.    COPD- she uses her anoro inhaler once a day.  Does not need the albuterol.  Her oxygen at home is 95-97% at home.  She has oxygen at home and does not want to use it, wants it to be taken back by company that brought it    HCM- she has not had the Hep A or shingles vaccine.       The following portions of the patient's history were reviewed and updated as appropriate: allergies, current medications, past family history, past medical history, past social history, past surgical history and problem list.    Review of Systems   Constitutional: Negative for activity change, appetite change and unexpected weight change.   Eyes: Negative for visual disturbance.   Respiratory: Negative for shortness of breath.    Cardiovascular: Negative for chest pain, palpitations and leg swelling.   Gastrointestinal: Negative for abdominal pain.   Allergic/Immunologic: Positive for environmental allergies.   Neurological: Negative for headaches.   Psychiatric/Behavioral: Negative for dysphoric mood and sleep disturbance. The patient is not nervous/anxious.        Objective   /62   Pulse 73   Temp 98.1 °F (36.7 °C)   Ht 157.5 cm (62.01\")   Wt 73.9 kg (163 lb)   SpO2 96%   BMI 29.80 kg/m²   Body mass index is 29.8 kg/m².  Physical Exam   Constitutional: She is " oriented to person, place, and time. She appears well-developed and well-nourished. No distress.   Very pleasant female, appears stated age, no distress today   HENT:   Head: Normocephalic and atraumatic.   Right Ear: External ear normal.   Left Ear: External ear normal.   Mouth/Throat: Oropharynx is clear and moist.   Eyes: Pupils are equal, round, and reactive to light. Conjunctivae and EOM are normal.   Neck: Normal range of motion. Neck supple. No thyromegaly present.   Cardiovascular: Normal rate, regular rhythm and intact distal pulses.   Murmur heard.  No carotid bruits  Systolic murmur grade 2/6 heard loudest over left sternal border   Pulmonary/Chest: Effort normal and breath sounds normal. No respiratory distress. She has no wheezes.   Abdominal: Soft. Bowel sounds are normal. She exhibits no distension. There is no tenderness.   Musculoskeletal: Normal range of motion. She exhibits no edema.   Lymphadenopathy:     She has no cervical adenopathy.   Neurological: She is alert and oriented to person, place, and time. No cranial nerve deficit. Coordination normal.   Skin:   Right cheek with 1 cm brown slightly raised macular lesion consistent with SK   Psychiatric: She has a normal mood and affect. Her behavior is normal. Judgment and thought content normal.   Nursing note and vitals reviewed.      Assessment/Plan   Teena Coe is here today and the following problems have been addressed:      Teena was seen today for follow-up.    Diagnoses and all orders for this visit:    Benign essential hypertension  -     CBC & Differential  -     Comprehensive Metabolic Panel  -     Lipid Panel With / Chol / HDL Ratio    Mixed hyperlipidemia  -     Comprehensive Metabolic Panel  -     Lipid Panel With / Chol / HDL Ratio    Centrilobular emphysema (CMS/HCC)    Gastroesophageal reflux disease without esophagitis    Other specified hypothyroidism    Heart murmur  -     Adult Transthoracic Echo Complete W/ Cont if  Necessary Per Protocol; Future      Labs as noted  No current GERD sxs, off meds  Continue Anoro inhaler, COPD is well controlled  She is compliant with thyroid medication in a.m. before all other medications  Echocardiogram ordered to evaluate heart murmur  Follow heart healthy/low salt diet  Avoid processed foods  Monitor blood pressure as discussed  Exercise as tolerated up to 150 minutes per week  Take all medications as prescribed  Labs as noted    Return in about 6 months (around 8/6/2020) for Medicare Wellness.      Marilyn K. Vermeesch, MD      Please note that portions of this note were completed with a voice recognition program.  Efforts were made to edit dictation, but occasionally words are mistranscribed.

## 2020-02-12 ENCOUNTER — HOSPITAL ENCOUNTER (OUTPATIENT)
Dept: CARDIOLOGY | Facility: HOSPITAL | Age: 80
Discharge: HOME | End: 2020-02-12
Admitting: INTERNAL MEDICINE

## 2020-02-12 VITALS
DIASTOLIC BLOOD PRESSURE: 60 MMHG | WEIGHT: 160.94 LBS | SYSTOLIC BLOOD PRESSURE: 120 MMHG | BODY MASS INDEX: 29.62 KG/M2 | HEIGHT: 62 IN

## 2020-02-12 DIAGNOSIS — R01.1 HEART MURMUR: ICD-10-CM

## 2020-02-12 LAB
BH CV ECHO MEAS - AO MAX PG (FULL): 4.6 MMHG
BH CV ECHO MEAS - AO MAX PG: 9 MMHG
BH CV ECHO MEAS - AO MEAN PG (FULL): 3 MMHG
BH CV ECHO MEAS - AO MEAN PG: 5 MMHG
BH CV ECHO MEAS - AO ROOT AREA (BSA CORRECTED): 1.8
BH CV ECHO MEAS - AO ROOT AREA: 8 CM^2
BH CV ECHO MEAS - AO ROOT DIAM: 3.2 CM
BH CV ECHO MEAS - AO V2 MAX: 154 CM/SEC
BH CV ECHO MEAS - AO V2 MEAN: 110 CM/SEC
BH CV ECHO MEAS - AO V2 VTI: 33.3 CM
BH CV ECHO MEAS - ASC AORTA: 2.5 CM
BH CV ECHO MEAS - AVA(I,A): 2.2 CM^2
BH CV ECHO MEAS - AVA(I,D): 2.2 CM^2
BH CV ECHO MEAS - AVA(V,A): 1.9 CM^2
BH CV ECHO MEAS - AVA(V,D): 1.9 CM^2
BH CV ECHO MEAS - BSA(HAYCOCK): 1.8 M^2
BH CV ECHO MEAS - BSA: 1.8 M^2
BH CV ECHO MEAS - BZI_BMI: 29.8 KILOGRAMS/M^2
BH CV ECHO MEAS - BZI_METRIC_HEIGHT: 157.5 CM
BH CV ECHO MEAS - BZI_METRIC_WEIGHT: 73.9 KG
BH CV ECHO MEAS - EDV(CUBED): 49.4 ML
BH CV ECHO MEAS - EDV(MOD-SP2): 54 ML
BH CV ECHO MEAS - EDV(MOD-SP4): 45 ML
BH CV ECHO MEAS - EDV(TEICH): 57 ML
BH CV ECHO MEAS - EF(CUBED): 66.5 %
BH CV ECHO MEAS - EF(MOD-BP): 59 %
BH CV ECHO MEAS - EF(MOD-SP2): 63 %
BH CV ECHO MEAS - EF(MOD-SP4): 53.3 %
BH CV ECHO MEAS - EF(TEICH): 58.9 %
BH CV ECHO MEAS - ESV(CUBED): 16.6 ML
BH CV ECHO MEAS - ESV(MOD-SP2): 20 ML
BH CV ECHO MEAS - ESV(MOD-SP4): 21 ML
BH CV ECHO MEAS - ESV(TEICH): 23.4 ML
BH CV ECHO MEAS - FS: 30.5 %
BH CV ECHO MEAS - IVS/LVPW: 0.76
BH CV ECHO MEAS - IVSD: 0.77 CM
BH CV ECHO MEAS - LA DIMENSION: 3 CM
BH CV ECHO MEAS - LA/AO: 0.94
BH CV ECHO MEAS - LAD MAJOR: 3.9 CM
BH CV ECHO MEAS - LAT PEAK E' VEL: 7.8 CM/SEC
BH CV ECHO MEAS - LATERAL E/E' RATIO: 12.9
BH CV ECHO MEAS - LV DIASTOLIC VOL/BSA (35-75): 25.7 ML/M^2
BH CV ECHO MEAS - LV MASS(C)D: 93.9 GRAMS
BH CV ECHO MEAS - LV MASS(C)DI: 53.6 GRAMS/M^2
BH CV ECHO MEAS - LV MAX PG: 4.4 MMHG
BH CV ECHO MEAS - LV MEAN PG: 2 MMHG
BH CV ECHO MEAS - LV SYSTOLIC VOL/BSA (12-30): 12 ML/M^2
BH CV ECHO MEAS - LV V1 MAX: 105 CM/SEC
BH CV ECHO MEAS - LV V1 MEAN: 70.1 CM/SEC
BH CV ECHO MEAS - LV V1 VTI: 26.1 CM
BH CV ECHO MEAS - LVIDD: 3.7 CM
BH CV ECHO MEAS - LVIDS: 2.6 CM
BH CV ECHO MEAS - LVLD AP2: 6.2 CM
BH CV ECHO MEAS - LVLD AP4: 5.8 CM
BH CV ECHO MEAS - LVLS AP2: 4.8 CM
BH CV ECHO MEAS - LVLS AP4: 5.1 CM
BH CV ECHO MEAS - LVOT AREA (M): 2.8 CM^2
BH CV ECHO MEAS - LVOT AREA: 2.8 CM^2
BH CV ECHO MEAS - LVOT DIAM: 1.9 CM
BH CV ECHO MEAS - LVPWD: 1 CM
BH CV ECHO MEAS - MED PEAK E' VEL: 5.3 CM/SEC
BH CV ECHO MEAS - MEDIAL E/E' RATIO: 19.1
BH CV ECHO MEAS - MV A MAX VEL: 102 CM/SEC
BH CV ECHO MEAS - MV DEC SLOPE: 384 CM/SEC^2
BH CV ECHO MEAS - MV DEC TIME: 0.23 SEC
BH CV ECHO MEAS - MV E MAX VEL: 101 CM/SEC
BH CV ECHO MEAS - MV E/A: 0.99
BH CV ECHO MEAS - MV P1/2T MAX VEL: 106 CM/SEC
BH CV ECHO MEAS - MV P1/2T: 80.9 MSEC
BH CV ECHO MEAS - MVA P1/2T LCG: 2.1 CM^2
BH CV ECHO MEAS - MVA(P1/2T): 2.7 CM^2
BH CV ECHO MEAS - PA ACC SLOPE: 609 CM/SEC^2
BH CV ECHO MEAS - PA ACC TIME: 0.12 SEC
BH CV ECHO MEAS - PA PR(ACCEL): 24.6 MMHG
BH CV ECHO MEAS - PI END-D VEL: 102 CM/SEC
BH CV ECHO MEAS - SI(AO): 152.8 ML/M^2
BH CV ECHO MEAS - SI(CUBED): 18.7 ML/M^2
BH CV ECHO MEAS - SI(LVOT): 42.2 ML/M^2
BH CV ECHO MEAS - SI(MOD-SP2): 19.4 ML/M^2
BH CV ECHO MEAS - SI(MOD-SP4): 13.7 ML/M^2
BH CV ECHO MEAS - SI(TEICH): 19.1 ML/M^2
BH CV ECHO MEAS - SV(AO): 267.8 ML
BH CV ECHO MEAS - SV(CUBED): 32.8 ML
BH CV ECHO MEAS - SV(LVOT): 74 ML
BH CV ECHO MEAS - SV(MOD-SP2): 34 ML
BH CV ECHO MEAS - SV(MOD-SP4): 24 ML
BH CV ECHO MEAS - SV(TEICH): 33.6 ML
BH CV ECHO MEASUREMENTS AVERAGE E/E' RATIO: 15.42
BH CV VAS BP LEFT ARM: NORMAL MMHG
LEFT ATRIUM VOLUME INDEX: 17.7 ML/M^2
LEFT ATRIUM VOLUME: 31 ML
LV EF 2D ECHO EST: 60 %

## 2020-02-12 PROCEDURE — 93306 TTE W/DOPPLER COMPLETE: CPT

## 2020-02-12 PROCEDURE — 93306 TTE W/DOPPLER COMPLETE: CPT | Performed by: INTERNAL MEDICINE

## 2020-02-24 ENCOUNTER — TELEPHONE (OUTPATIENT)
Dept: INTERNAL MEDICINE | Facility: CLINIC | Age: 80
End: 2020-02-24

## 2020-02-24 NOTE — TELEPHONE ENCOUNTER
Daughter in law is not on [facility]BA, also do not see an US in chart but it looks like she had an Echo on the 12th?

## 2020-02-24 NOTE — TELEPHONE ENCOUNTER
PT'S DAUGHTER IN LAW  WAS CALLING FOR THE PT  TO GET RESULTS OF HER ULTRA SOUND  FROM LAST WEEK     PLEASE ADVISE AND GIVE CALL [phone]

## 2020-02-28 NOTE — TELEPHONE ENCOUNTER
Attempted calling Ros, phone kept ringing. There are no recent lab or imaging results on Pt.       HUB may deliver message.

## 2020-04-14 RX ORDER — HYDROCHLOROTHIAZIDE 12.5 MG/1
CAPSULE, GELATIN COATED ORAL
Qty: 180 CAPSULE | Refills: 1 | Status: SHIPPED | OUTPATIENT
Start: 2020-04-14 | End: 2020-10-01

## 2020-06-23 DIAGNOSIS — E78.2 MIXED HYPERLIPIDEMIA: ICD-10-CM

## 2020-06-24 RX ORDER — PRAVASTATIN SODIUM 80 MG/1
TABLET ORAL
Qty: 90 TABLET | Refills: 1 | Status: SHIPPED | OUTPATIENT
Start: 2020-06-24 | End: 2021-01-20

## 2020-08-12 RX ORDER — LEVOTHYROXINE SODIUM 0.1 MG/1
100 TABLET ORAL DAILY
Qty: 90 TABLET | Refills: 0 | Status: SHIPPED | OUTPATIENT
Start: 2020-08-12 | End: 2020-09-01 | Stop reason: SDUPTHER

## 2020-08-14 RX ORDER — FLUTICASONE PROPIONATE 50 MCG
SPRAY, SUSPENSION (ML) NASAL
Qty: 32 G | Refills: 2 | Status: SHIPPED | OUTPATIENT
Start: 2020-08-14 | End: 2021-03-25

## 2020-08-25 ENCOUNTER — OFFICE VISIT (OUTPATIENT)
Dept: INTERNAL MEDICINE | Facility: CLINIC | Age: 80
End: 2020-08-25

## 2020-08-25 VITALS
WEIGHT: 161 LBS | TEMPERATURE: 97.1 F | HEART RATE: 94 BPM | SYSTOLIC BLOOD PRESSURE: 126 MMHG | DIASTOLIC BLOOD PRESSURE: 74 MMHG | BODY MASS INDEX: 29.63 KG/M2 | HEIGHT: 62 IN | OXYGEN SATURATION: 97 %

## 2020-08-25 DIAGNOSIS — J43.2 CENTRILOBULAR EMPHYSEMA (HCC): ICD-10-CM

## 2020-08-25 DIAGNOSIS — E78.2 MIXED HYPERLIPIDEMIA: ICD-10-CM

## 2020-08-25 DIAGNOSIS — Z00.00 ENCOUNTER FOR MEDICARE ANNUAL WELLNESS EXAM: Primary | ICD-10-CM

## 2020-08-25 DIAGNOSIS — K21.9 GASTROESOPHAGEAL REFLUX DISEASE WITHOUT ESOPHAGITIS: ICD-10-CM

## 2020-08-25 DIAGNOSIS — J30.2 SEASONAL ALLERGIES: ICD-10-CM

## 2020-08-25 DIAGNOSIS — E03.8 OTHER SPECIFIED HYPOTHYROIDISM: ICD-10-CM

## 2020-08-25 DIAGNOSIS — I10 BENIGN ESSENTIAL HYPERTENSION: ICD-10-CM

## 2020-08-25 PROCEDURE — G0439 PPPS, SUBSEQ VISIT: HCPCS | Performed by: INTERNAL MEDICINE

## 2020-08-25 PROCEDURE — 99397 PER PM REEVAL EST PAT 65+ YR: CPT | Performed by: INTERNAL MEDICINE

## 2020-08-25 PROCEDURE — 96160 PT-FOCUSED HLTH RISK ASSMT: CPT | Performed by: INTERNAL MEDICINE

## 2020-08-25 NOTE — PROGRESS NOTES
The ABCs of the Annual Wellness Visit  Subsequent Medicare Wellness Visit    Chief Complaint   Patient presents with   • Medicare Wellness-subsequent       Subjective   History of Present Illness:  Teena Coe is a 80 y.o. female who presents for a Subsequent Medicare Wellness Visit.  PMH of HTN, HLD, CAD, hypothyroidism, COPD, GERD, allergies.  BP is well controlled today, patient denies any chest pain, SOA, palpitations or edema.  She is compliant with thyroid medication, takes it daily with water only in PM a few hours after eating.  She sees Dr. Porter for her COPD and remains on Anoro inhaler with good control of her shortness of breath.  She denies any current GERD symptoms.  She remains on Flonase for allergy symptoms and it is working well for her.    HEALTH RISK ASSESSMENT    Recent Hospitalizations:  No hospitalization(s) within the last year.    Current Medical Providers:  Patient Care Team:  Vermeesch, Marilyn K, MD as PCP - General  Vermeesch, Marilyn K, MD as PCP - Family Medicine    Smoking Status:  Social History     Tobacco Use   Smoking Status Former Smoker   • Packs/day: 1.00   • Types: Cigarettes   Smokeless Tobacco Never Used   Tobacco Comment    QUIT 30 YEARS AGO       Alcohol Consumption:  Social History     Substance and Sexual Activity   Alcohol Use No       Depression Screen:   PHQ-2/PHQ-9 Depression Screening 8/25/2020   Little interest or pleasure in doing things 0   Feeling down, depressed, or hopeless 0   Total Score 0       Fall Risk Screen:  LISSET Fall Risk Assessment was completed, and patient is at LOW risk for falls.Assessment completed on:8/25/2020    Health Habits and Functional and Cognitive Screening:  Functional & Cognitive Status 8/25/2020   Do you have difficulty preparing food and eating? No   Do you have difficulty bathing yourself, getting dressed or grooming yourself? No   Do you have difficulty using the toilet? No   Do you have difficulty moving around from place to  place? No   Do you have trouble with steps or getting out of a bed or a chair? No   Current Diet Unhealthy Diet   Dental Exam Not up to date   Eye Exam Up to date   Exercise (times per week) 0 times per week   Current Exercise Activities Include None   Do you need help using the phone?  No   Are you deaf or do you have serious difficulty hearing?  Yes   Do you need help with transportation? Yes   Do you need help shopping? No   Do you need help preparing meals?  No   Do you need help with housework?  No   Do you need help with laundry? No   Do you need help taking your medications? No   Do you need help managing money? No   Do you ever drive or ride in a car without wearing a seat belt? No   Have you felt unusual stress, anger or loneliness in the last month? No   Who do you live with? Spouse   If you need help, do you have trouble finding someone available to you? No   Do you have difficulty concentrating, remembering or making decisions? No         Does the patient have evidence of cognitive impairment? No    Asprin use counseling:Taking ASA appropriately as indicated    Age-appropriate Screening Schedule:  Refer to the list below for future screening recommendations based on patient's age, sex and/or medical conditions. Orders for these recommended tests are listed in the plan section. The patient has been provided with a written plan.    Health Maintenance   Topic Date Due   • LIPID PANEL  04/08/2020   • MAMMOGRAM  04/19/2020   • INFLUENZA VACCINE  08/01/2020   • TDAP/TD VACCINES (2 - Td) 03/31/2027   • ZOSTER VACCINE  Discontinued          The following portions of the patient's history were reviewed and updated as appropriate: allergies, current medications, past family history, past medical history, past social history, past surgical history and problem list.    Outpatient Medications Prior to Visit   Medication Sig Dispense Refill   • albuterol sulfate HFA (VENTOLIN HFA) 108 (90 Base) MCG/ACT inhaler Inhale  2 puffs Every 6 (Six) Hours As Needed for Wheezing or Shortness of Air. for wheezing 1 inhaler 3   • ANORO ELLIPTA 62.5-25 MCG/INH aerosol powder  inhaler INHALE 1 PUFF EVERY  each 3   • aspirin 81 MG tablet Take  by mouth Daily.     • Cholecalciferol (VITAMIN D PO) Take  by mouth Daily.     • Cyanocobalamin (VITAMIN B-12 CR PO) Take  by mouth Daily.     • fluticasone (FLONASE) 50 MCG/ACT nasal spray USE 1 SPRAY IN EACH NOSTRIL EVERY DAY 32 g 2   • hydroCHLOROthiazide (MICROZIDE) 12.5 MG capsule TAKE 1 TO 2 CAPSULES EVERY DAY  FOR  BLOOD  PRESSURE 180 capsule 1   • levothyroxine (SYNTHROID, LEVOTHROID) 100 MCG tablet Take 1 tablet by mouth Daily. PATIENT MUST HAVE AN APPOINTMENT FOR FURTHER REFILLS. 90 tablet 0   • losartan (COZAAR) 50 MG tablet TAKE 1 TABLET EVERY DAY 90 tablet 3   • Multiple Vitamins-Minerals (CENTRUM ADULTS PO) Take  by mouth Daily.     • pravastatin (PRAVACHOL) 80 MG tablet TAKE 1 TABLET EVERY DAY 90 tablet 1     No facility-administered medications prior to visit.        Patient Active Problem List   Diagnosis   • Benign essential hypertension   • Chronic coronary artery disease   • Edema   • Gastroesophageal reflux disease without esophagitis   • Heart murmur   • Hyperlipidemia   • Hypothyroidism   • Seasonal allergies   • Encounter for Medicare annual wellness exam   • Centrilobular emphysema (CMS/HCC)       Advanced Care Planning:  ACP discussion was held with the patient during this visit. Patient has an advance directive in EMR which is still valid.     Review of Systems   Constitutional: Negative.    HENT: Negative.    Eyes: Negative.    Respiratory: Positive for cough.    Cardiovascular: Negative.    Gastrointestinal: Negative.    Endocrine: Negative.    Genitourinary: Negative.    Musculoskeletal: Positive for arthralgias.   Skin: Negative.    Allergic/Immunologic: Positive for environmental allergies.   Neurological: Negative.    Hematological: Negative.   "  Psychiatric/Behavioral: Negative.        Compared to one year ago, the patient feels her physical health is the same.  Compared to one year ago, the patient feels her mental health is the same.    Reviewed chart for potential of high risk medication in the elderly: yes  Reviewed chart for potential of harmful drug interactions in the elderly:yes    Objective         Vitals:    08/25/20 1030   BP: 126/74   Pulse: 94   Temp: 97.1 °F (36.2 °C)   SpO2: 97%   Weight: 73 kg (161 lb)   Height: 157.5 cm (62.01\")   PainSc: 0-No pain       Body mass index is 29.44 kg/m².  Discussed the patient's BMI with her. The BMI is above average; BMI management plan is completed.    Physical Exam   Constitutional: She is oriented to person, place, and time. She appears well-developed and well-nourished. No distress.   Very kind and pleasant female, she appears her stated age, she is wearing a mask and in no distress today   HENT:   Head: Normocephalic and atraumatic.   Right Ear: External ear normal.   Left Ear: External ear normal.   TMs normal, canals free of wax   Eyes: Pupils are equal, round, and reactive to light. Conjunctivae and EOM are normal. Right eye exhibits no discharge. Left eye exhibits no discharge.   Neck: Normal range of motion. Neck supple. No thyromegaly present.   Cardiovascular: Normal rate, regular rhythm and intact distal pulses.   Murmur heard.  No carotid bruits  Systolic murmur grade 2/6 heard loudest over left upper sternal border   Pulmonary/Chest: Effort normal. No respiratory distress. She has no wheezes. Right breast exhibits inverted nipple. Right breast exhibits no mass, no nipple discharge, no skin change and no tenderness. Left breast exhibits inverted nipple. Left breast exhibits no mass, no nipple discharge, no skin change and no tenderness.   Slightly diminished breath sounds at bases   Abdominal: Soft. Bowel sounds are normal. She exhibits no distension. There is no tenderness. "   Musculoskeletal: Normal range of motion. She exhibits no edema.   Lymphadenopathy:     She has no cervical adenopathy.   Neurological: She is alert and oriented to person, place, and time. No cranial nerve deficit. Coordination normal.   Skin: No rash noted.   Left side of neck and supraclavicular area there is a 1 cm pedunculated skin tag  Right face medial cheek inferior to lower eyelid there is a 1 cm brown slightly elevated macular lesion consistent with SK   Psychiatric: She has a normal mood and affect. Her behavior is normal. Judgment and thought content normal.   Nursing note and vitals reviewed.            Assessment/Plan   Medicare Risks and Personalized Health Plan  CMS Preventative Services Quick Reference  Cardiovascular risk  Diabetic Lab Screening   Immunizations Discussed/Encouraged (specific immunizations; Hepatitis A Vaccine/Series and Shingrix )  Inactivity/Sedentary  Obesity/Overweight     The above risks/problems have been discussed with the patient.  Pertinent information has been shared with the patient in the After Visit Summary.  Follow up plans and orders are seen below in the Assessment/Plan Section.    Diagnoses and all orders for this visit:    1. Encounter for Medicare annual wellness exam (Primary)  -     T4, Free  -     TSH    2. Benign essential hypertension    3. Mixed hyperlipidemia    4. Centrilobular emphysema (CMS/[facility])    5. Gastroesophageal reflux disease without esophagitis    6. Other specified hypothyroidism  -     T4, Free  -     TSH    7. Seasonal allergies    Follow heart healthy/low salt diet  Avoid processed foods  Monitor blood pressure as discussed  Exercise as tolerated up to 30 minutes 5 days per week  Take all medications as prescribed  Labs as noted, including labs from Feb  Continue anoro inhaler  Continue vit D daily  She has no GERD sxs at this time  Her allergies are doing well on flonase  She is not bothered by skin tag or seborrheic keratosis lesions noted  on exam-she was reassured that these are benign at this time, encouraged her to continue monitoring these, especially right cheek facial lesion  Encouraged patient to consider hepatitis A and shingles vaccine at local pharmacy  Recommend flu vaccine next month when available at clinic, or she may get this at local pharmacy also    Follow Up:  Return in about 6 months (around 2/25/2021) for Next scheduled follow up.     An After Visit Summary and PPPS were given to the patient.

## 2020-08-26 DIAGNOSIS — E03.8 OTHER SPECIFIED HYPOTHYROIDISM: Primary | ICD-10-CM

## 2020-08-26 LAB
ALBUMIN SERPL-MCNC: 4.4 G/DL (ref 3.5–5.2)
ALBUMIN/GLOB SERPL: 2.2 G/DL
ALP SERPL-CCNC: 51 U/L (ref 39–117)
ALT SERPL-CCNC: 12 U/L (ref 1–33)
AST SERPL-CCNC: 18 U/L (ref 1–32)
BASOPHILS # BLD AUTO: 0.06 10*3/MM3 (ref 0–0.2)
BASOPHILS NFR BLD AUTO: 1 % (ref 0–1.5)
BILIRUB SERPL-MCNC: 0.6 MG/DL (ref 0–1.2)
BUN SERPL-MCNC: 17 MG/DL (ref 8–23)
BUN/CREAT SERPL: 14.4 (ref 7–25)
CALCIUM SERPL-MCNC: 10.2 MG/DL (ref 8.6–10.5)
CHLORIDE SERPL-SCNC: 99 MMOL/L (ref 98–107)
CHOLEST SERPL-MCNC: 161 MG/DL (ref 0–200)
CHOLEST/HDLC SERPL: 2.78 {RATIO}
CO2 SERPL-SCNC: 27.7 MMOL/L (ref 22–29)
CREAT SERPL-MCNC: 1.18 MG/DL (ref 0.57–1)
EOSINOPHIL # BLD AUTO: 0.36 10*3/MM3 (ref 0–0.4)
EOSINOPHIL NFR BLD AUTO: 5.8 % (ref 0.3–6.2)
ERYTHROCYTE [DISTWIDTH] IN BLOOD BY AUTOMATED COUNT: 12.6 % (ref 12.3–15.4)
GLOBULIN SER CALC-MCNC: 2 GM/DL
GLUCOSE SERPL-MCNC: 90 MG/DL (ref 65–99)
HCT VFR BLD AUTO: 42.5 % (ref 34–46.6)
HDLC SERPL-MCNC: 58 MG/DL (ref 40–60)
HGB BLD-MCNC: 14.2 G/DL (ref 12–15.9)
IMM GRANULOCYTES # BLD AUTO: 0.02 10*3/MM3 (ref 0–0.05)
IMM GRANULOCYTES NFR BLD AUTO: 0.3 % (ref 0–0.5)
LDLC SERPL CALC-MCNC: 73 MG/DL (ref 0–100)
LYMPHOCYTES # BLD AUTO: 2.06 10*3/MM3 (ref 0.7–3.1)
LYMPHOCYTES NFR BLD AUTO: 33.1 % (ref 19.6–45.3)
MCH RBC QN AUTO: 31 PG (ref 26.6–33)
MCHC RBC AUTO-ENTMCNC: 33.4 G/DL (ref 31.5–35.7)
MCV RBC AUTO: 92.8 FL (ref 79–97)
MONOCYTES # BLD AUTO: 0.55 10*3/MM3 (ref 0.1–0.9)
MONOCYTES NFR BLD AUTO: 8.8 % (ref 5–12)
NEUTROPHILS # BLD AUTO: 3.17 10*3/MM3 (ref 1.7–7)
NEUTROPHILS NFR BLD AUTO: 51 % (ref 42.7–76)
NRBC BLD AUTO-RTO: 0 /100 WBC (ref 0–0.2)
PLATELET # BLD AUTO: 249 10*3/MM3 (ref 140–450)
POTASSIUM SERPL-SCNC: 3.9 MMOL/L (ref 3.5–5.2)
PROT SERPL-MCNC: 6.4 G/DL (ref 6–8.5)
RBC # BLD AUTO: 4.58 10*6/MM3 (ref 3.77–5.28)
SODIUM SERPL-SCNC: 138 MMOL/L (ref 136–145)
T4 FREE SERPL-MCNC: 1.86 NG/DL (ref 0.93–1.7)
TRIGL SERPL-MCNC: 150 MG/DL (ref 0–150)
TSH SERPL DL<=0.005 MIU/L-ACNC: 0.7 UIU/ML (ref 0.27–4.2)
VLDLC SERPL CALC-MCNC: 30 MG/DL
WBC # BLD AUTO: 6.22 10*3/MM3 (ref 3.4–10.8)

## 2020-09-01 RX ORDER — LEVOTHYROXINE SODIUM 88 UG/1
88 TABLET ORAL DAILY
Qty: 30 TABLET | Refills: 1 | Status: SHIPPED | OUTPATIENT
Start: 2020-09-01 | End: 2020-10-26 | Stop reason: SDUPTHER

## 2020-10-01 RX ORDER — LOSARTAN POTASSIUM 50 MG/1
TABLET ORAL
Qty: 90 TABLET | Refills: 3 | Status: SHIPPED | OUTPATIENT
Start: 2020-10-01 | End: 2021-07-01

## 2020-10-01 RX ORDER — HYDROCHLOROTHIAZIDE 12.5 MG/1
CAPSULE, GELATIN COATED ORAL
Qty: 180 CAPSULE | Refills: 3 | Status: SHIPPED | OUTPATIENT
Start: 2020-10-01 | End: 2021-07-01

## 2020-10-12 LAB
T4 FREE SERPL-MCNC: 1.39 NG/DL (ref 0.93–1.7)
TSH SERPL DL<=0.005 MIU/L-ACNC: 1.74 UIU/ML (ref 0.27–4.2)

## 2020-10-26 ENCOUNTER — TELEPHONE (OUTPATIENT)
Dept: INTERNAL MEDICINE | Facility: CLINIC | Age: 80
End: 2020-10-26

## 2020-10-26 RX ORDER — LEVOTHYROXINE SODIUM 88 UG/1
88 TABLET ORAL DAILY
Qty: 90 TABLET | Refills: 3 | Status: SHIPPED | OUTPATIENT
Start: 2020-10-26 | End: 2021-10-08 | Stop reason: SDUPTHER

## 2020-10-26 NOTE — TELEPHONE ENCOUNTER
Caller: PATRICIA    Relationship:  DAUGHTER    PLEASE CALL DAUGHTER BACK TO VERIFY MEDICATIONS. Best call back number: [phone]    Medication needed:   Requested Prescriptions     Pending Prescriptions Disp Refills   • levothyroxine (SYNTHROID, LEVOTHROID) 88 MCG tablet 30 tablet 1     Sig: Take 1 tablet by mouth Daily. Check labs week of 10/12/20       What details did the patient provide when requesting the medication:  PATIENT HAS A WEEKS WORTH OF MEDICATION LEFT.      Does the patient have less than a 3 day supply:  [] Yes  [x] No    What is the patient's preferred pharmacy: [facility] - [facility] [address] - [phone]  - [phone] FX

## 2020-10-29 ENCOUNTER — OFFICE VISIT (OUTPATIENT)
Dept: PULMONOLOGY | Facility: CLINIC | Age: 80
End: 2020-10-29

## 2020-10-29 VITALS
RESPIRATION RATE: 18 BRPM | HEART RATE: 74 BPM | BODY MASS INDEX: 30.36 KG/M2 | TEMPERATURE: 97.7 F | HEIGHT: 62 IN | OXYGEN SATURATION: 94 % | DIASTOLIC BLOOD PRESSURE: 78 MMHG | SYSTOLIC BLOOD PRESSURE: 138 MMHG | WEIGHT: 165 LBS

## 2020-10-29 DIAGNOSIS — J30.89 OTHER ALLERGIC RHINITIS: ICD-10-CM

## 2020-10-29 DIAGNOSIS — R06.02 SHORTNESS OF BREATH: Primary | ICD-10-CM

## 2020-10-29 DIAGNOSIS — J44.9 CHRONIC OBSTRUCTIVE PULMONARY DISEASE, UNSPECIFIED COPD TYPE (HCC): ICD-10-CM

## 2020-10-29 PROCEDURE — 99213 OFFICE O/P EST LOW 20 MIN: CPT | Performed by: INTERNAL MEDICINE

## 2020-10-29 NOTE — PROGRESS NOTES
"Chief Complaint   Patient presents with   • Follow-up   • Cough         Subjective   Teena Coe is a 80 y.o. female.     History of Present Illness   Patient was evaluated today for follow up of shortness of breath, allergic rhinitis and COPD.     Patient says that her symptoms have been stable since the last clinic visit. she reports no recent exacerbations.     Patient is using medications, as prescribed. Exercise tolerance has also remained stable.     Patient says that she has been using her nasal sprays on a regular basis and describes no significant ongoing issues other than occasional congestion.     She has not been \"needing the oxygen\" and doesn't use it.     The following portions of the patient's history were reviewed and updated as appropriate: allergies, current medications, past family history, past medical history, past social history and past surgical history.    Review of Systems   Constitutional: Negative for chills, fatigue and fever.   HENT: Negative for sinus pressure, sinus pain, sneezing and sore throat.    Respiratory: Positive for cough. Negative for shortness of breath and wheezing.    Cardiovascular: Negative for chest pain and leg swelling.   Psychiatric/Behavioral: Negative for sleep disturbance.       Objective   Visit Vitals  /78   Pulse 74   Temp 97.7 °F (36.5 °C)   Resp 18   Ht 157.5 cm (62.01\")   Wt 74.8 kg (165 lb)   SpO2 94%   BMI 30.17 kg/m²       Physical Exam  Vitals signs reviewed.   Constitutional:       Appearance: She is well-developed.   HENT:      Head: Normocephalic and atraumatic.   Eyes:      Extraocular Movements: Extraocular movements intact.   Neck:      Musculoskeletal: Neck supple.   Cardiovascular:      Rate and Rhythm: Normal rate.      Heart sounds: Murmur present.   Pulmonary:      Comments: Somewhat hyperresonant to percussion.  Somewhat decreased air entry.  No obvious wheezing noted.   Neurological:      Mental Status: She is alert. "         Assessment/Plan   Diagnoses and all orders for this visit:    1. Shortness of breath (Primary)  -     BIPAP / CPAP Adjustment    2. Chronic obstructive pulmonary disease, unspecified COPD type (CMS/[facility])  -     BIPAP / CPAP Adjustment    3. Other allergic rhinitis           Return if symptoms worsen or fail to improve.    DISCUSSION (if any):  We have reviewed her pulmonary medications in great detail.    Any needed adjustments to her pulmonary medications, either for clinical or insurance coverage reasons, have been made and are reflected in the orders.    Compliance with medications stressed.     Side effects of prescribed medications discussed with the patient    Patient was advised to continue her nasal spray, especially given improvement in symptoms overall.    Since she is not willing to consider using her oxygen at night, we will discontinue.     Dictated utilizing Dragon dictation.    This document was electronically signed by Ayan Porter MD on 10/29/20 at 15:56 EDT

## 2021-01-20 DIAGNOSIS — E78.2 MIXED HYPERLIPIDEMIA: ICD-10-CM

## 2021-01-21 RX ORDER — PRAVASTATIN SODIUM 80 MG/1
TABLET ORAL
Qty: 90 TABLET | Refills: 3 | Status: SHIPPED | OUTPATIENT
Start: 2021-01-21 | End: 2021-12-30

## 2021-03-01 ENCOUNTER — OFFICE VISIT (OUTPATIENT)
Dept: INTERNAL MEDICINE | Facility: CLINIC | Age: 81
End: 2021-03-01

## 2021-03-01 VITALS
BODY MASS INDEX: 30 KG/M2 | SYSTOLIC BLOOD PRESSURE: 140 MMHG | TEMPERATURE: 97 F | HEIGHT: 62 IN | HEART RATE: 78 BPM | DIASTOLIC BLOOD PRESSURE: 70 MMHG | OXYGEN SATURATION: 94 % | WEIGHT: 163 LBS

## 2021-03-01 DIAGNOSIS — E03.8 OTHER SPECIFIED HYPOTHYROIDISM: ICD-10-CM

## 2021-03-01 DIAGNOSIS — F03.90 DEMENTIA WITHOUT BEHAVIORAL DISTURBANCE, UNSPECIFIED DEMENTIA TYPE: ICD-10-CM

## 2021-03-01 DIAGNOSIS — J43.2 CENTRILOBULAR EMPHYSEMA (HCC): ICD-10-CM

## 2021-03-01 DIAGNOSIS — I10 BENIGN ESSENTIAL HYPERTENSION: Primary | ICD-10-CM

## 2021-03-01 DIAGNOSIS — E78.2 MIXED HYPERLIPIDEMIA: ICD-10-CM

## 2021-03-01 PROCEDURE — 99214 OFFICE O/P EST MOD 30 MIN: CPT | Performed by: INTERNAL MEDICINE

## 2021-03-01 RX ORDER — DONEPEZIL HYDROCHLORIDE 5 MG/1
5 TABLET, FILM COATED ORAL NIGHTLY
Qty: 90 TABLET | Refills: 1 | Status: SHIPPED | OUTPATIENT
Start: 2021-03-01 | End: 2021-08-12

## 2021-03-01 NOTE — PROGRESS NOTES
Chief Complaint   Patient presents with   • Follow-up     for HTN, GERD, HLD, and Hypothyroidism.      Subjective   Teena Coe is a 81 y.o. female.     Here for follow up of HTN, HLD, hypothyroid, GERD, vitD def, allergies.  HTN/HLD- Denies CP, SOB, some occasional edema.  BP is elevated today-usually 130/70s at home.  She takes her HCTZ in AM and losartan in AM.  She takes her pravastatin every night. She does like her junk food on occasion  Hypothyroid- takes her thyroid med daily with water at night. No difficulty swallowing or change in voice  GERD- She is not having any GERD symptoms at this time  Vit D def- she takes her vit D daily.  She is also taking vit B12.    COPD- she uses her anoro inhaler once a day.  Does not need the albuterol.  Her oxygen at home is 95-97% at home.   HCM-pneumococcal vaccines are up-to-date.  She has not had the seasonal flu vaccine, Hep A or shingles vaccine.       The following portions of the patient's history were reviewed and updated as appropriate: allergies, current medications, past family history, past medical history, past social history, past surgical history and problem list.    Review of Systems   Constitutional: Negative for activity change, appetite change and unexpected weight change.   HENT: Negative for trouble swallowing and voice change.    Eyes: Negative for visual disturbance.   Respiratory: Negative for shortness of breath.    Cardiovascular: Negative for chest pain, palpitations and leg swelling.   Gastrointestinal: Negative for abdominal pain.   Endocrine: Negative for cold intolerance and heat intolerance.   Musculoskeletal: Negative for arthralgias and myalgias.   Allergic/Immunologic: Positive for environmental allergies.   Neurological: Negative for headaches.   Psychiatric/Behavioral: Positive for confusion. Negative for dysphoric mood and sleep disturbance. The patient is not nervous/anxious.        Objective   /70   Pulse 78   Temp 97 °F  "(36.1 °C)   Ht 157.5 cm (62.01\")   Wt 73.9 kg (163 lb)   SpO2 94%   BMI 29.80 kg/m²   Body mass index is 29.8 kg/m².  Physical Exam  Vitals signs and nursing note reviewed.   Constitutional:       General: She is not in acute distress.     Appearance: Normal appearance. She is well-developed. She is not ill-appearing.      Comments: Kind and pleasant female, appears her age and in no distress today   HENT:      Head: Normocephalic and atraumatic.      Right Ear: External ear normal.      Left Ear: External ear normal.   Eyes:      General:         Right eye: No discharge.         Left eye: No discharge.      Extraocular Movements: Extraocular movements intact.      Conjunctiva/sclera: Conjunctivae normal.      Pupils: Pupils are equal, round, and reactive to light.   Neck:      Musculoskeletal: Normal range of motion and neck supple.      Thyroid: No thyromegaly.   Cardiovascular:      Rate and Rhythm: Normal rate and regular rhythm.      Pulses: Normal pulses.      Heart sounds: Normal heart sounds. No murmur.      Comments: No carotid bruits  Pulmonary:      Effort: Pulmonary effort is normal. No respiratory distress.      Breath sounds: Normal breath sounds. No wheezing.   Abdominal:      General: Bowel sounds are normal. There is no distension.      Palpations: Abdomen is soft.      Tenderness: There is no abdominal tenderness.   Musculoskeletal: Normal range of motion.      Right lower leg: No edema.      Left lower leg: No edema.   Lymphadenopathy:      Cervical: No cervical adenopathy.   Skin:     General: Skin is warm.      Findings: No rash.   Neurological:      General: No focal deficit present.      Mental Status: She is alert. Mental status is at baseline.      Cranial Nerves: No cranial nerve deficit.      Motor: No weakness.      Coordination: Coordination normal.      Gait: Gait normal.   Psychiatric:         Mood and Affect: Mood normal.         Behavior: Behavior normal.         Thought Content: " Thought content normal.         Judgment: Judgment normal.      Comments: Pleasantly confused, her  helps answer many questions for her         Assessment/Plan   Teena Coe is here today and the following problems have been addressed:      Diagnoses and all orders for this visit:    1. Benign essential hypertension (Primary)    2. Mixed hyperlipidemia    3. Other specified hypothyroidism    4. Centrilobular emphysema (CMS/HCC)    5. Dementia without behavioral disturbance, unspecified dementia type (CMS/HCC)    Other orders  -     donepezil (Aricept) 5 MG tablet; Take 1 tablet by mouth Every Night.  Dispense: 90 tablet; Refill: 1        Follow heart healthy/low salt diet  Avoid processed foods  Exercise as tolerated   Take all medications as prescribed  Start donepezil 5 mg every evening due to worsening memory-possible GI side effects discussed with   Encouraged  to monitor blood pressure closely, he states it tends to run 130/80 at home  Continue current dose of levothyroxine, TFTs were normal on last office visit  Continue Anoro inhaler,  states she rarely uses albuterol    Return in about 6 months (around 9/1/2021) for Medicare Wellness.      Marilyn K. Vermeesch, MD      Please note that portions of this note were completed with a voice recognition program.  Efforts were made to edit dictation, but occasionally words are mistranscribed.

## 2021-03-25 RX ORDER — FLUTICASONE PROPIONATE 50 MCG
SPRAY, SUSPENSION (ML) NASAL
Qty: 32 G | Refills: 2 | Status: SHIPPED | OUTPATIENT
Start: 2021-03-25 | End: 2022-07-11

## 2021-07-01 RX ORDER — LOSARTAN POTASSIUM 50 MG/1
TABLET ORAL
Qty: 90 TABLET | Refills: 3 | Status: SHIPPED | OUTPATIENT
Start: 2021-07-01 | End: 2022-07-13 | Stop reason: SDUPTHER

## 2021-07-01 RX ORDER — HYDROCHLOROTHIAZIDE 12.5 MG/1
CAPSULE, GELATIN COATED ORAL
Qty: 180 CAPSULE | Refills: 3 | Status: SHIPPED | OUTPATIENT
Start: 2021-07-01 | End: 2022-06-22

## 2021-08-12 RX ORDER — DONEPEZIL HYDROCHLORIDE 5 MG/1
5 TABLET, FILM COATED ORAL NIGHTLY
Qty: 90 TABLET | Refills: 0 | Status: SHIPPED | OUTPATIENT
Start: 2021-08-12 | End: 2021-10-08 | Stop reason: SDUPTHER

## 2021-10-08 ENCOUNTER — OFFICE VISIT (OUTPATIENT)
Dept: INTERNAL MEDICINE | Facility: CLINIC | Age: 81
End: 2021-10-08

## 2021-10-08 VITALS
OXYGEN SATURATION: 97 % | DIASTOLIC BLOOD PRESSURE: 70 MMHG | SYSTOLIC BLOOD PRESSURE: 134 MMHG | TEMPERATURE: 97 F | HEIGHT: 62 IN | HEART RATE: 66 BPM | BODY MASS INDEX: 29.08 KG/M2 | WEIGHT: 158 LBS

## 2021-10-08 DIAGNOSIS — Z00.00 ENCOUNTER FOR MEDICARE ANNUAL WELLNESS EXAM: Primary | ICD-10-CM

## 2021-10-08 DIAGNOSIS — I25.10 CHRONIC CORONARY ARTERY DISEASE: ICD-10-CM

## 2021-10-08 DIAGNOSIS — E78.2 MIXED HYPERLIPIDEMIA: ICD-10-CM

## 2021-10-08 DIAGNOSIS — J43.2 CENTRILOBULAR EMPHYSEMA (HCC): ICD-10-CM

## 2021-10-08 DIAGNOSIS — F03.90 DEMENTIA WITHOUT BEHAVIORAL DISTURBANCE, UNSPECIFIED DEMENTIA TYPE: ICD-10-CM

## 2021-10-08 DIAGNOSIS — K21.9 GASTROESOPHAGEAL REFLUX DISEASE WITHOUT ESOPHAGITIS: ICD-10-CM

## 2021-10-08 DIAGNOSIS — I10 BENIGN ESSENTIAL HYPERTENSION: ICD-10-CM

## 2021-10-08 DIAGNOSIS — E03.8 OTHER SPECIFIED HYPOTHYROIDISM: ICD-10-CM

## 2021-10-08 PROCEDURE — 90662 IIV NO PRSV INCREASED AG IM: CPT | Performed by: INTERNAL MEDICINE

## 2021-10-08 PROCEDURE — 1126F AMNT PAIN NOTED NONE PRSNT: CPT | Performed by: INTERNAL MEDICINE

## 2021-10-08 PROCEDURE — 1170F FXNL STATUS ASSESSED: CPT | Performed by: INTERNAL MEDICINE

## 2021-10-08 PROCEDURE — 96160 PT-FOCUSED HLTH RISK ASSMT: CPT | Performed by: INTERNAL MEDICINE

## 2021-10-08 PROCEDURE — 1159F MED LIST DOCD IN RCRD: CPT | Performed by: INTERNAL MEDICINE

## 2021-10-08 PROCEDURE — 99397 PER PM REEVAL EST PAT 65+ YR: CPT | Performed by: INTERNAL MEDICINE

## 2021-10-08 PROCEDURE — G0439 PPPS, SUBSEQ VISIT: HCPCS | Performed by: INTERNAL MEDICINE

## 2021-10-08 PROCEDURE — G0008 ADMIN INFLUENZA VIRUS VAC: HCPCS | Performed by: INTERNAL MEDICINE

## 2021-10-08 RX ORDER — DONEPEZIL HYDROCHLORIDE 5 MG/1
5 TABLET, FILM COATED ORAL NIGHTLY
Qty: 90 TABLET | Refills: 1 | Status: SHIPPED | OUTPATIENT
Start: 2021-10-08 | End: 2022-03-02

## 2021-10-08 RX ORDER — LEVOTHYROXINE SODIUM 88 UG/1
88 TABLET ORAL DAILY
Qty: 90 TABLET | Refills: 1 | Status: SHIPPED | OUTPATIENT
Start: 2021-10-08 | End: 2022-04-08 | Stop reason: SDUPTHER

## 2021-10-08 NOTE — PROGRESS NOTES
The ABCs of the Annual Wellness Visit  Subsequent Medicare Wellness Visit    Chief Complaint   Patient presents with   • Medicare Wellness-subsequent      Subjective    History of Present Illness:  Teena Coe is a 81 y.o. female who presents for a Subsequent Medicare Wellness Visit.  PMH of HTN, CAD, HLD, GERD, dementia and COPD.  No CP, palpitations, SOA, edema.  No GERD sxs.  She eats whatever she wants and does not avoid fast/fatty foods.  She does not drink much water.  She takes all of her medications. She is sleeping well at night.     The following portions of the patient's history were reviewed and   updated as appropriate: allergies, current medications, past family history, past medical history, past social history, past surgical history and problem list.    Compared to one year ago, the patient feels her physical   health is the same.    Compared to one year ago, the patient feels her mental   health is the same.    Recent Hospitalizations:  She was not admitted to the hospital during the last year.       Current Medical Providers:  Patient Care Team:  Vermeesch, Marilyn K, MD as PCP - General (Internal Medicine & Pediatrics)  Ayan Porter MD as Consulting Physician (Pulmonary Disease)  Radha Royal APRN as Nurse Practitioner (Pulmonary Disease)    Outpatient Medications Prior to Visit   Medication Sig Dispense Refill   • albuterol sulfate HFA (VENTOLIN HFA) 108 (90 Base) MCG/ACT inhaler Inhale 2 puffs Every 6 (Six) Hours As Needed for Wheezing or Shortness of Air. for wheezing 1 inhaler 3   • aspirin 81 MG tablet Take  by mouth Daily.     • Cholecalciferol (VITAMIN D PO) Take  by mouth Daily.     • Cyanocobalamin (VITAMIN B-12 CR PO) Take  by mouth Daily.     • fluticasone (FLONASE) 50 MCG/ACT nasal spray USE 1 SPRAY IN EACH NOSTRIL EVERY DAY 32 g 2   • hydroCHLOROthiazide (MICROZIDE) 12.5 MG capsule TAKE 1 TO 2 CAPSULES EVERY DAY  FOR  BLOOD  PRESSURE 180 capsule 3   • losartan  (COZAAR) 50 MG tablet TAKE 1 TABLET EVERY DAY 90 tablet 3   • Multiple Vitamins-Minerals (CENTRUM ADULTS PO) Take  by mouth Daily.     • pravastatin (PRAVACHOL) 80 MG tablet TAKE 1 TABLET EVERY DAY 90 tablet 3   • umeclidinium-vilanterol (Anoro Ellipta) 62.5-25 MCG/INH aerosol powder  inhaler Inhale 1 puff Daily. 180 each 3   • donepezil (ARICEPT) 5 MG tablet Take 1 tablet by mouth Every Night. 90 tablet 0   • levothyroxine (SYNTHROID, LEVOTHROID) 88 MCG tablet Take 1 tablet by mouth Daily. Check labs week of 10/12/20 90 tablet 3     No facility-administered medications prior to visit.       No opioid medication identified on active medication list. I have reviewed chart for other potential  high risk medication/s and harmful drug interactions in the elderly.          Aspirin is on active medication list. Aspirin use is indicated based on review of current medical condition/s. Pros and cons of this therapy have been discussed today. Benefits of this medication outweigh potential harm.  Patient has been encouraged to continue taking this medication.  .      Patient Active Problem List   Diagnosis   • Benign essential hypertension   • Chronic coronary artery disease   • Edema   • Gastroesophageal reflux disease without esophagitis   • Heart murmur   • Hyperlipidemia   • Hypothyroidism   • Seasonal allergies   • Encounter for Medicare annual wellness exam   • Centrilobular emphysema (HCC)   • Dementia (HCC)     Advance Care Planning  Advance Directive is on file.  ACP discussion was held with the patient during this visit. Patient has an advance directive in EMR which is still valid.     Review of Systems   Constitutional: Negative.    HENT: Negative.    Eyes: Negative.    Respiratory: Positive for shortness of breath.    Cardiovascular: Negative.    Gastrointestinal: Negative.    Endocrine: Negative.    Genitourinary: Negative.    Musculoskeletal: Negative.    Skin: Negative.    Allergic/Immunologic: Positive for  "environmental allergies.   Neurological: Negative.  Positive for confusion.   Hematological: Bruises/bleeds easily.        Objective    Vitals:    10/08/21 1053   BP: 134/70   Pulse: 66   Temp: 97 °F (36.1 °C)   SpO2: 97%   Weight: 71.7 kg (158 lb)   Height: 157.5 cm (62.01\")   PainSc: 0-No pain     BMI Readings from Last 1 Encounters:   10/08/21 28.89 kg/m²   BMI is above normal parameters. Recommendations include: exercise counseling and nutrition counseling    Does the patient have evidence of cognitive impairment? Yes    Physical Exam  Vitals and nursing note reviewed.   Constitutional:       General: She is not in acute distress.     Appearance: Normal appearance. She is well-developed. She is not ill-appearing.      Comments: Kind and pleasant female, appears stated age and in NAD today   HENT:      Head: Normocephalic and atraumatic.      Right Ear: Tympanic membrane, ear canal and external ear normal.      Left Ear: Tympanic membrane, ear canal and external ear normal.      Ears:      Comments: Hard of hearing  Eyes:      General:         Right eye: No discharge.         Left eye: No discharge.      Extraocular Movements: Extraocular movements intact.      Conjunctiva/sclera: Conjunctivae normal.      Pupils: Pupils are equal, round, and reactive to light.   Neck:      Thyroid: No thyromegaly.      Vascular: No carotid bruit.      Comments: No thyromegaly or mass  Cardiovascular:      Rate and Rhythm: Normal rate and regular rhythm.      Pulses: Normal pulses.      Heart sounds: Murmur heard.        Comments: Systolic murmur grade 2/6 heard loudest over left sternal border  Pulmonary:      Effort: Pulmonary effort is normal. No respiratory distress.      Breath sounds: Normal breath sounds. No wheezing.   Chest:      Breasts:         Right: Inverted nipple present. No swelling, bleeding, mass, nipple discharge, skin change or tenderness.         Left: Inverted nipple present. No swelling, bleeding, mass, " nipple discharge, skin change or tenderness.   Abdominal:      General: Bowel sounds are normal. There is no distension.      Palpations: Abdomen is soft.      Tenderness: There is no abdominal tenderness.   Musculoskeletal:         General: Normal range of motion.      Cervical back: Normal range of motion and neck supple.      Right lower leg: No edema.      Left lower leg: No edema.   Lymphadenopathy:      Cervical: No cervical adenopathy.      Upper Body:      Right upper body: No supraclavicular, axillary or pectoral adenopathy.      Left upper body: No supraclavicular, axillary or pectoral adenopathy.   Skin:     General: Skin is warm.      Findings: No rash.      Comments: Diffusely well tanned skin   Neurological:      General: No focal deficit present.      Mental Status: She is alert. Mental status is at baseline.      Cranial Nerves: No cranial nerve deficit.      Motor: No weakness.      Coordination: Coordination normal.      Gait: Gait normal.   Psychiatric:         Mood and Affect: Mood normal.         Behavior: Behavior normal.      Comments: Patient answers most questions appropriately, however her  does chime in with an occasional answer to help her                 HEALTH RISK ASSESSMENT    Smoking Status:  Social History     Tobacco Use   Smoking Status Former Smoker   • Packs/day: 1.00   • Types: Cigarettes   Smokeless Tobacco Never Used   Tobacco Comment    QUIT 30 YEARS AGO     Alcohol Consumption:  Social History     Substance and Sexual Activity   Alcohol Use No     Fall Risk Screen:    STEADI Fall Risk Assessment was completed, and patient is at LOW risk for falls.Assessment completed on:10/8/2021    Depression Screening:  PHQ-2/PHQ-9 Depression Screening 10/8/2021   Little interest or pleasure in doing things 0   Feeling down, depressed, or hopeless 0   Total Score 0       Health Habits and Functional and Cognitive Screening:  Functional & Cognitive Status 10/8/2021   Do you have  difficulty preparing food and eating? No   Do you have difficulty bathing yourself, getting dressed or grooming yourself? No   Do you have difficulty using the toilet? No   Do you have difficulty moving around from place to place? No   Do you have trouble with steps or getting out of a bed or a chair? No   Current Diet Unhealthy Diet   Dental Exam Not up to date   Eye Exam Up to date   Exercise (times per week) 0 times per week   Current Exercises Include No Regular Exercise   Current Exercise Activities Include -   Do you need help using the phone?  No   Are you deaf or do you have serious difficulty hearing?  Yes   Do you need help with transportation? Yes   Do you need help shopping? No   Do you need help preparing meals?  No   Do you need help with housework?  No   Do you need help with laundry? No   Do you need help taking your medications? No   Do you need help managing money? No   Do you ever drive or ride in a car without wearing a seat belt? No   Have you felt unusual stress, anger or loneliness in the last month? No   Who do you live with? Spouse   If you need help, do you have trouble finding someone available to you? No   Have you been bothered in the last four weeks by sexual problems? No   Do you have difficulty concentrating, remembering or making decisions? No       Age-appropriate Screening Schedule:  Refer to the list below for future screening recommendations based on patient's age, sex and/or medical conditions. Orders for these recommended tests are listed in the plan section. The patient has been provided with a written plan.    Health Maintenance   Topic Date Due   • INFLUENZA VACCINE  08/01/2021   • LIPID PANEL  08/25/2021   • DXA SCAN  10/08/2021 (Originally 1940)   • TDAP/TD VACCINES (2 - Td or Tdap) 03/31/2027   • MAMMOGRAM  Discontinued   • ZOSTER VACCINE  Discontinued              Assessment/Plan   CMS Preventative Services Quick Reference  Risk Factors Identified During  Encounter  Cardiovascular Disease  Dementia/Memory   Hearing Problem  Immunizations Discussed/Encouraged (specific Immunizations; Influenza  Inactivity/Sedentary  Obesity/Overweight   The above risks/problems have been discussed with the patient.  Follow up actions/plans if indicated are seen below in the Assessment/Plan Section.  Pertinent information has been shared with the patient in the After Visit Summary.    Diagnoses and all orders for this visit:    1. Encounter for Medicare annual wellness exam (Primary)  -     CBC & Differential  -     Comprehensive Metabolic Panel  -     Lipid Panel With / Chol / HDL Ratio  -     T4, Free  -     TSH  Flu vaccine provided today  Encourage Covid booster in a few weeks at local pharmacy or here in clinic    2. Benign essential hypertension  -     CBC & Differential  -     Comprehensive Metabolic Panel  Follow heart healthy/low salt diet  Avoid processed foods  Monitor blood pressure as discussed  Exercise as tolerated up to 30 minutes 5 days per week  Take all medications as prescribed    3. Chronic coronary artery disease  Continue baby aspirin daily    4. Mixed hyperlipidemia  -     Comprehensive Metabolic Panel  -     Lipid Panel With / Chol / HDL Ratio  Continue pravastatin daily  Follow low-fat/low-cholesterol diet    5. Other specified hypothyroidism  -     T4, Free  -     TSH  Continue current dose of levothyroxine, will adjust dose if needed based on labs    6. Gastroesophageal reflux disease without esophagitis  -     CBC & Differential  She denies any current GERD symptoms, if needed she takes Tums    7. Dementia without behavioral disturbance, unspecified dementia type (HCC)  Continue Aricept 5 mg every night,  states that he feels her memory is unchanged.  She sleeps through the night per  and has no current agitation    8. Centrilobular emphysema (HCC)  Continue Anoro inhaler 1 puff daily and use albuterol as needed, patient states she rarely uses  albuterol    Other orders  -     donepezil (ARICEPT) 5 MG tablet; Take 1 tablet by mouth Every Night.  Dispense: 90 tablet; Refill: 1  -     levothyroxine (SYNTHROID, LEVOTHROID) 88 MCG tablet; Take 1 tablet by mouth Daily.  Dispense: 90 tablet; Refill: 1  -     Fluzone High-Dose 65+yrs ([id number])        Follow Up:   Return in about 6 months (around 4/8/2022) for Next scheduled follow up.     An After Visit Summary and PPPS were made available to the patient.

## 2021-10-09 LAB
ALBUMIN SERPL-MCNC: 4.6 G/DL (ref 3.5–5.2)
ALBUMIN/GLOB SERPL: 2.1 G/DL
ALP SERPL-CCNC: 62 U/L (ref 39–117)
ALT SERPL-CCNC: 14 U/L (ref 1–33)
AST SERPL-CCNC: 20 U/L (ref 1–32)
BASOPHILS # BLD AUTO: 0.06 10*3/MM3 (ref 0–0.2)
BASOPHILS NFR BLD AUTO: 0.8 % (ref 0–1.5)
BILIRUB SERPL-MCNC: 0.7 MG/DL (ref 0–1.2)
BUN SERPL-MCNC: 20 MG/DL (ref 8–23)
BUN/CREAT SERPL: 19.8 (ref 7–25)
CALCIUM SERPL-MCNC: 11.3 MG/DL (ref 8.6–10.5)
CHLORIDE SERPL-SCNC: 97 MMOL/L (ref 98–107)
CHOLEST SERPL-MCNC: 154 MG/DL (ref 0–200)
CHOLEST/HDLC SERPL: 2.37 {RATIO}
CO2 SERPL-SCNC: 29.8 MMOL/L (ref 22–29)
CREAT SERPL-MCNC: 1.01 MG/DL (ref 0.57–1)
EOSINOPHIL # BLD AUTO: 0.48 10*3/MM3 (ref 0–0.4)
EOSINOPHIL NFR BLD AUTO: 6.1 % (ref 0.3–6.2)
ERYTHROCYTE [DISTWIDTH] IN BLOOD BY AUTOMATED COUNT: 12.4 % (ref 12.3–15.4)
GLOBULIN SER CALC-MCNC: 2.2 GM/DL
GLUCOSE SERPL-MCNC: 94 MG/DL (ref 65–99)
HCT VFR BLD AUTO: 42.6 % (ref 34–46.6)
HDLC SERPL-MCNC: 65 MG/DL (ref 40–60)
HGB BLD-MCNC: 14 G/DL (ref 12–15.9)
IMM GRANULOCYTES # BLD AUTO: 0.02 10*3/MM3 (ref 0–0.05)
IMM GRANULOCYTES NFR BLD AUTO: 0.3 % (ref 0–0.5)
LDLC SERPL CALC-MCNC: 66 MG/DL (ref 0–100)
LYMPHOCYTES # BLD AUTO: 2.12 10*3/MM3 (ref 0.7–3.1)
LYMPHOCYTES NFR BLD AUTO: 27 % (ref 19.6–45.3)
MCH RBC QN AUTO: 31.3 PG (ref 26.6–33)
MCHC RBC AUTO-ENTMCNC: 32.9 G/DL (ref 31.5–35.7)
MCV RBC AUTO: 95.1 FL (ref 79–97)
MONOCYTES # BLD AUTO: 0.72 10*3/MM3 (ref 0.1–0.9)
MONOCYTES NFR BLD AUTO: 9.2 % (ref 5–12)
NEUTROPHILS # BLD AUTO: 4.45 10*3/MM3 (ref 1.7–7)
NEUTROPHILS NFR BLD AUTO: 56.6 % (ref 42.7–76)
NRBC BLD AUTO-RTO: 0 /100 WBC (ref 0–0.2)
PLATELET # BLD AUTO: 260 10*3/MM3 (ref 140–450)
POTASSIUM SERPL-SCNC: 4.1 MMOL/L (ref 3.5–5.2)
PROT SERPL-MCNC: 6.8 G/DL (ref 6–8.5)
RBC # BLD AUTO: 4.48 10*6/MM3 (ref 3.77–5.28)
SODIUM SERPL-SCNC: 138 MMOL/L (ref 136–145)
T4 FREE SERPL-MCNC: 1.78 NG/DL (ref 0.93–1.7)
TRIGL SERPL-MCNC: 134 MG/DL (ref 0–150)
TSH SERPL DL<=0.005 MIU/L-ACNC: 0.84 UIU/ML (ref 0.27–4.2)
VLDLC SERPL CALC-MCNC: 23 MG/DL (ref 5–40)
WBC # BLD AUTO: 7.85 10*3/MM3 (ref 3.4–10.8)

## 2021-10-11 NOTE — PROGRESS NOTES
Please call  with lab results.  Complete blood count is normal.  Kidney function is slightly improved compared to previously.  Her calcium level is elevated due to use of hydrochlorothiazide.  Cholesterol panel is very good.  Her thyroid function tests reveal 1 is in normal range and other is slightly elevated.  Please tell him to be certain he is giving thyroid medication 1/2-hour before all other medications and food with water only.

## 2021-11-23 ENCOUNTER — OFFICE VISIT (OUTPATIENT)
Dept: INTERNAL MEDICINE | Facility: CLINIC | Age: 81
End: 2021-11-23

## 2021-11-23 VITALS
TEMPERATURE: 97.5 F | HEART RATE: 89 BPM | HEIGHT: 62 IN | BODY MASS INDEX: 27.75 KG/M2 | DIASTOLIC BLOOD PRESSURE: 80 MMHG | SYSTOLIC BLOOD PRESSURE: 132 MMHG | WEIGHT: 150.8 LBS | OXYGEN SATURATION: 99 %

## 2021-11-23 DIAGNOSIS — J44.1 COPD WITH EXACERBATION (HCC): Primary | ICD-10-CM

## 2021-11-23 DIAGNOSIS — R05.9 COUGH: ICD-10-CM

## 2021-11-23 DIAGNOSIS — I10 BENIGN ESSENTIAL HYPERTENSION: ICD-10-CM

## 2021-11-23 DIAGNOSIS — R09.81 NASAL CONGESTION: ICD-10-CM

## 2021-11-23 PROCEDURE — 99214 OFFICE O/P EST MOD 30 MIN: CPT | Performed by: NURSE PRACTITIONER

## 2021-11-23 PROCEDURE — U0004 COV-19 TEST NON-CDC HGH THRU: HCPCS | Performed by: NURSE PRACTITIONER

## 2021-11-23 RX ORDER — PREDNISONE 20 MG/1
20 TABLET ORAL DAILY
Qty: 10 TABLET | Refills: 0 | Status: SHIPPED | OUTPATIENT
Start: 2021-11-23 | End: 2021-12-03

## 2021-11-23 RX ORDER — BENZONATATE 100 MG/1
100 CAPSULE ORAL 3 TIMES DAILY PRN
Qty: 30 CAPSULE | Refills: 0 | Status: SHIPPED | OUTPATIENT
Start: 2021-11-23 | End: 2022-10-11

## 2021-11-23 RX ORDER — DOXYCYCLINE HYCLATE 100 MG/1
100 CAPSULE ORAL 2 TIMES DAILY
Qty: 20 CAPSULE | Refills: 0 | Status: SHIPPED | OUTPATIENT
Start: 2021-11-23 | End: 2021-12-03

## 2021-11-24 LAB — SARS-COV-2 RNA NOSE QL NAA+PROBE: NOT DETECTED

## 2021-12-29 DIAGNOSIS — E78.2 MIXED HYPERLIPIDEMIA: ICD-10-CM

## 2021-12-30 RX ORDER — PRAVASTATIN SODIUM 80 MG/1
TABLET ORAL
Qty: 90 TABLET | Refills: 3 | Status: SHIPPED | OUTPATIENT
Start: 2021-12-30 | End: 2022-09-07

## 2022-03-01 DIAGNOSIS — F03.90 DEMENTIA WITHOUT BEHAVIORAL DISTURBANCE, UNSPECIFIED DEMENTIA TYPE: Primary | ICD-10-CM

## 2022-03-02 RX ORDER — TIOTROPIUM BROMIDE AND OLODATEROL 3.124; 2.736 UG/1; UG/1
SPRAY, METERED RESPIRATORY (INHALATION)
Qty: 12 G | Refills: 0 | OUTPATIENT
Start: 2022-03-02

## 2022-03-02 RX ORDER — DONEPEZIL HYDROCHLORIDE 5 MG/1
5 TABLET, FILM COATED ORAL NIGHTLY
Qty: 90 TABLET | Refills: 1 | Status: SHIPPED | OUTPATIENT
Start: 2022-03-02 | End: 2022-09-15

## 2022-03-02 NOTE — TELEPHONE ENCOUNTER
Rx Refill Note  Requested Prescriptions     Pending Prescriptions Disp Refills   • donepezil (ARICEPT) 5 MG tablet [Pharmacy Med Name: DONEPEZIL HYDROCHLORIDE 5 MG Tablet] 90 tablet 1     Sig: TAKE 1 TABLET BY MOUTH EVERY NIGHT.      Last office visit with prescribing clinician: 10/8/2021      Next office visit with prescribing clinician: 4/8/2022            LISA SHAY MA  03/02/22, 08:02 EST

## 2022-04-08 ENCOUNTER — OFFICE VISIT (OUTPATIENT)
Dept: INTERNAL MEDICINE | Facility: CLINIC | Age: 82
End: 2022-04-08

## 2022-04-08 VITALS
SYSTOLIC BLOOD PRESSURE: 122 MMHG | WEIGHT: 148 LBS | OXYGEN SATURATION: 96 % | HEART RATE: 70 BPM | TEMPERATURE: 97.1 F | DIASTOLIC BLOOD PRESSURE: 76 MMHG | HEIGHT: 62 IN | BODY MASS INDEX: 27.23 KG/M2

## 2022-04-08 DIAGNOSIS — I10 BENIGN ESSENTIAL HYPERTENSION: Primary | ICD-10-CM

## 2022-04-08 DIAGNOSIS — J43.2 CENTRILOBULAR EMPHYSEMA: ICD-10-CM

## 2022-04-08 DIAGNOSIS — E78.2 MIXED HYPERLIPIDEMIA: ICD-10-CM

## 2022-04-08 DIAGNOSIS — E03.8 OTHER SPECIFIED HYPOTHYROIDISM: ICD-10-CM

## 2022-04-08 DIAGNOSIS — K21.9 GASTROESOPHAGEAL REFLUX DISEASE WITHOUT ESOPHAGITIS: ICD-10-CM

## 2022-04-08 DIAGNOSIS — F03.90 DEMENTIA WITHOUT BEHAVIORAL DISTURBANCE, UNSPECIFIED DEMENTIA TYPE: ICD-10-CM

## 2022-04-08 PROCEDURE — 99214 OFFICE O/P EST MOD 30 MIN: CPT | Performed by: INTERNAL MEDICINE

## 2022-04-08 RX ORDER — LEVOTHYROXINE SODIUM 88 UG/1
88 TABLET ORAL DAILY
Qty: 90 TABLET | Refills: 1 | Status: SHIPPED | OUTPATIENT
Start: 2022-04-08 | End: 2022-08-29

## 2022-04-08 NOTE — PROGRESS NOTES
"Chief Complaint   Patient presents with   • Follow-up     For HTN, dementia, GERD, HLD, and hypothyroidism.      Subjective   Teena Coe is a 82 y.o. female.     Here for follow up of HTN, HLD, hypothyroid, GERD, vitD def, allergies.  HTN/HLD- Denies CP, SOB, some occasional edema.  BP and heart rate are well controlled today.  She takes her pravastatin every night.   Hypothyroid- takes her thyroid med daily with water at night. No difficulty swallowing or change in voice  GERD- She is not having any GERD symptoms at this time  Vit D def- she takes her vit D daily.  She is also taking vit B12.    COPD-patient was on Anoro and insurance stopped paying for this.  She called Dr. Porter's office and they [facility], she is requesting a refill.  Denies any cough, wheezing or shortness of breath.  Does not need the albuterol.  Her oxygen at home is 95-97% at home.   HCM-all vaccines are up-to-date except she has not had a COVID booster.       The following portions of the patient's history were reviewed and updated as appropriate: allergies, current medications, past family history, past medical history, past social history, past surgical history and problem list.    Review of Systems   Constitutional: Negative for activity change, appetite change and unexpected weight change.   HENT: Negative for trouble swallowing and voice change.    Eyes: Negative for visual disturbance.   Respiratory: Negative for shortness of breath.    Cardiovascular: Negative for chest pain, palpitations and leg swelling.   Gastrointestinal: Negative for abdominal pain and constipation.   Genitourinary: Negative for hematuria.   Musculoskeletal: Positive for gait problem. Negative for back pain.   Neurological: Negative for headaches.   Psychiatric/Behavioral: Positive for confusion. Negative for dysphoric mood and sleep disturbance.       Objective   /76   Pulse 70   Temp 97.1 °F (36.2 °C)   Ht 157.5 cm (62.01\")   Wt 67.1 kg (148 " lb)   SpO2 96%   BMI 27.06 kg/m²   Body mass index is 27.06 kg/m².  Physical Exam  Vitals and nursing note reviewed.   Constitutional:       General: She is not in acute distress.     Appearance: Normal appearance. She is well-developed. She is not ill-appearing.      Comments: Kind and pleasant elderly female, appears stated age and in NAD today   HENT:      Head: Normocephalic and atraumatic.      Right Ear: External ear normal.      Left Ear: External ear normal.   Eyes:      General:         Right eye: No discharge.         Left eye: No discharge.      Extraocular Movements: Extraocular movements intact.      Conjunctiva/sclera: Conjunctivae normal.   Neck:      Thyroid: No thyromegaly.      Vascular: No carotid bruit.      Comments: No thyromegaly or mass  Cardiovascular:      Rate and Rhythm: Normal rate and regular rhythm.      Pulses: Normal pulses.      Heart sounds: Murmur heard.      Comments: Systolic murmur grade 2/6 over precordium  Pulmonary:      Effort: Pulmonary effort is normal. No respiratory distress.      Breath sounds: Normal breath sounds. No wheezing.   Abdominal:      General: Bowel sounds are normal. There is no distension.      Palpations: Abdomen is soft.      Tenderness: There is no abdominal tenderness.   Musculoskeletal:         General: Normal range of motion.      Cervical back: Normal range of motion and neck supple.      Right lower leg: No edema.      Left lower leg: No edema.   Lymphadenopathy:      Cervical: No cervical adenopathy.   Skin:     General: Skin is warm.      Findings: No rash.   Neurological:      General: No focal deficit present.      Mental Status: She is alert. Mental status is at baseline.      Cranial Nerves: No cranial nerve deficit.      Motor: No weakness.      Coordination: Coordination normal.      Gait: Gait abnormal.      Comments: Short shuffling steps noted   Psychiatric:         Behavior: Behavior normal.         Thought Content: Thought content  normal.         Assessment/Plan   Teena Coe is here today and the following problems have been addressed:      Diagnoses and all orders for this visit:    1. Benign essential hypertension (Primary)    2. Mixed hyperlipidemia    3. Other specified hypothyroidism    4. Gastroesophageal reflux disease without esophagitis    5. Dementia without behavioral disturbance, unspecified dementia type (HCC)    6. Centrilobular emphysema (HCC)    Other orders  -     levothyroxine (SYNTHROID, LEVOTHROID) 88 MCG tablet; Take 1 tablet by mouth Daily.  Dispense: 90 tablet; Refill: 1  -     tiotropium (Spiriva HandiHaler) 18 MCG per inhalation capsule; Place 1 capsule into inhaler and inhale Daily.  Dispense: 90 capsule; Refill: 3        Follow heart healthy/low salt diet  Avoid processed foods  Monitor blood pressure as discussed  Exercise as tolerated  Take all medications as prescribed  Continue levothyroxine, labs due on next office visit  Encourage daily vitamin D and B12 3 days a week  Continue Aricept for underlying dementia  She is compliant with pravastatin every day  She denies any current GERD symptoms  She and  state that her breathing is doing well with Spiriva 2 puffs once daily, refill provided, she does not require albuterol  Encourage COVID booster    Return in about 6 months (around 10/8/2022) for Medicare Wellness.      Marilyn K. Vermeesch, MD      Please note that portions of this note were completed with a voice recognition program.  Efforts were made to edit dictation, but occasionally words are mistranscribed.

## 2022-05-03 ENCOUNTER — OFFICE VISIT (OUTPATIENT)
Dept: PULMONOLOGY | Facility: CLINIC | Age: 82
End: 2022-05-03

## 2022-05-03 VITALS
RESPIRATION RATE: 12 BRPM | DIASTOLIC BLOOD PRESSURE: 82 MMHG | WEIGHT: 146 LBS | TEMPERATURE: 97.1 F | OXYGEN SATURATION: 96 % | HEART RATE: 61 BPM | BODY MASS INDEX: 26.7 KG/M2 | SYSTOLIC BLOOD PRESSURE: 134 MMHG

## 2022-05-03 DIAGNOSIS — J44.9 CHRONIC OBSTRUCTIVE PULMONARY DISEASE, UNSPECIFIED COPD TYPE: Primary | ICD-10-CM

## 2022-05-03 PROCEDURE — 99213 OFFICE O/P EST LOW 20 MIN: CPT | Performed by: NURSE PRACTITIONER

## 2022-05-03 RX ORDER — TIOTROPIUM BROMIDE INHALATION SPRAY 3.12 UG/1
2 SPRAY, METERED RESPIRATORY (INHALATION) DAILY
Qty: 12 G | Refills: 1 | Status: SHIPPED | OUTPATIENT
Start: 2022-05-03 | End: 2022-09-07

## 2022-05-03 NOTE — PROGRESS NOTES
Chief Complaint   Patient presents with   • Breathing Problem         Subjective   Teena Coe is a 82 y.o. female.     History of Present Illness   Patient comes today for follow up of shortness of breath and COPD.     She was last seen in the clinic October 2020 and was told to follow-up on an as-needed basis at that time.    The patients  reports she needs Stiolto refilled but she was told to f/w us for refills.     Patient reports no recent exacerbations.     Patient is using medications, as prescribed. She has been using Stiolto. I'm not sure who prescribed the inhaler.     She denies any increased cough or worsening SOB. She does all activities without SOB or having to stop due to SOB.     She does not need oxygen.     Exercise tolerance has also remained stable.     She quit smoking 30 years ago.    The following portions of the patient's history were reviewed and updated as appropriate: allergies, current medications, past family history, past medical history, past social history and past surgical history.      Review of Systems   Constitutional: Negative for chills.   HENT: Negative for sneezing.    Respiratory: Positive for cough.    Psychiatric/Behavioral: Negative for sleep disturbance.       Objective   Visit Vitals  /82 (BP Location: Right arm, Patient Position: Sitting, Cuff Size: Adult)   Pulse 61   Temp 97.1 °F (36.2 °C)   Resp 12   Wt 66.2 kg (146 lb)   SpO2 96%   BMI 26.70 kg/m²         Physical Exam  Vitals reviewed.   HENT:      Head: Atraumatic.      Mouth/Throat:      Mouth: Mucous membranes are moist.   Eyes:      Extraocular Movements: Extraocular movements intact.   Cardiovascular:      Rate and Rhythm: Normal rate and regular rhythm.   Pulmonary:      Effort: Pulmonary effort is normal. No respiratory distress.      Breath sounds: Normal breath sounds. No wheezing.   Musculoskeletal:      Cervical back: Neck supple.      Comments: Gait normal.   Skin:     General: Skin is  warm.   Neurological:      Mental Status: She is alert and oriented to person, place, and time.         Assessment/Plan   Diagnoses and all orders for this visit:    1. Chronic obstructive pulmonary disease, unspecified COPD type (HCC) (Primary)    Other orders  -     tiotropium bromide monohydrate (Spiriva Respimat) 2.5 MCG/ACT aerosol solution inhaler; Inhale 2 puffs Daily.  Dispense: 12 g; Refill: 1           Return if symptoms worsen or fail to improve.    DISCUSSION (if any):  Her last PFT showed no obstruction.     I have asked her to try Spiriva which was ordered by her PCP but I ordered the respimat version instead of handihaler. She was hesitant to try handihaler. I have told her to discontinue Stiolto.     Her PCP can refill respiratory medication as she is not having any worsening of symptoms. She can follow up with us if symptoms worsen.    Compliance with medications stressed.     Side effects of prescribed medications discussed with the patient.    She is UTD on flu and Pneumonia vaccination.     I spent a total of 20 minutes face to face with this patient. Part of this time was spent in counseling patient about the pathophysiology of underlying disease process, reviewing available PFT data.  Time also includes a discussion regarding the need to avoid identified triggers and to be aware of any other triggers in the future, that may cause worsening respiratory status.  The need to use medications on a regular basis and lifestyle modifications that may positively impact patient's health were also discussed and are reflected in the time mentioned above.  Time also includes documentation in electronic health records.    Dictated utilizing Dragon dictation.    This document was electronically signed by ALIZE Vale May 3, 2022  14:51 EDT

## 2022-06-22 RX ORDER — HYDROCHLOROTHIAZIDE 12.5 MG/1
CAPSULE, GELATIN COATED ORAL
Qty: 180 CAPSULE | Refills: 3 | Status: SHIPPED | OUTPATIENT
Start: 2022-06-22

## 2022-07-11 RX ORDER — FLUTICASONE PROPIONATE 50 MCG
SPRAY, SUSPENSION (ML) NASAL
Qty: 32 G | Refills: 2 | Status: SHIPPED | OUTPATIENT
Start: 2022-07-11

## 2022-07-13 RX ORDER — LOSARTAN POTASSIUM 50 MG/1
50 TABLET ORAL DAILY
Qty: 90 TABLET | Refills: 3 | Status: SHIPPED | OUTPATIENT
Start: 2022-07-13

## 2022-08-29 RX ORDER — LEVOTHYROXINE SODIUM 88 UG/1
TABLET ORAL
Qty: 90 TABLET | Refills: 0 | Status: SHIPPED | OUTPATIENT
Start: 2022-08-29 | End: 2023-01-30

## 2022-08-29 NOTE — TELEPHONE ENCOUNTER
Rx Refill Note  Requested Prescriptions     Pending Prescriptions Disp Refills   • levothyroxine (SYNTHROID, LEVOTHROID) 88 MCG tablet [Pharmacy Med Name: LEVOTHYROXINE SODIUM 88 MCG Tablet] 90 tablet 1     Sig: TAKE 1 TABLET EVERY DAY      Last office visit with prescribing clinician: 4/8/2022      Next office visit with prescribing clinician: 10/11/2022            Marilynn Ayala LPN  08/29/22, 12:13 EDT

## 2022-09-06 DIAGNOSIS — E78.2 MIXED HYPERLIPIDEMIA: ICD-10-CM

## 2022-09-07 RX ORDER — PRAVASTATIN SODIUM 80 MG/1
TABLET ORAL
Qty: 90 TABLET | Refills: 3 | Status: SHIPPED | OUTPATIENT
Start: 2022-09-07

## 2022-09-07 RX ORDER — TIOTROPIUM BROMIDE INHALATION SPRAY 3.12 UG/1
SPRAY, METERED RESPIRATORY (INHALATION)
Qty: 12 G | Refills: 1 | Status: SHIPPED | OUTPATIENT
Start: 2022-09-07

## 2022-09-15 DIAGNOSIS — F03.90 DEMENTIA WITHOUT BEHAVIORAL DISTURBANCE, UNSPECIFIED DEMENTIA TYPE: ICD-10-CM

## 2022-09-15 RX ORDER — DONEPEZIL HYDROCHLORIDE 5 MG/1
TABLET, FILM COATED ORAL
Qty: 90 TABLET | Refills: 1 | Status: SHIPPED | OUTPATIENT
Start: 2022-09-15

## 2022-10-11 ENCOUNTER — OFFICE VISIT (OUTPATIENT)
Dept: INTERNAL MEDICINE | Facility: CLINIC | Age: 82
End: 2022-10-11

## 2022-10-11 VITALS
HEART RATE: 64 BPM | SYSTOLIC BLOOD PRESSURE: 120 MMHG | OXYGEN SATURATION: 96 % | DIASTOLIC BLOOD PRESSURE: 64 MMHG | HEIGHT: 62 IN | RESPIRATION RATE: 16 BRPM | WEIGHT: 152 LBS | BODY MASS INDEX: 27.97 KG/M2 | TEMPERATURE: 97.8 F

## 2022-10-11 DIAGNOSIS — Z23 NEED FOR INFLUENZA VACCINATION: ICD-10-CM

## 2022-10-11 DIAGNOSIS — E03.8 OTHER SPECIFIED HYPOTHYROIDISM: ICD-10-CM

## 2022-10-11 DIAGNOSIS — Z13.820 SCREENING FOR OSTEOPOROSIS: ICD-10-CM

## 2022-10-11 DIAGNOSIS — Z00.00 ENCOUNTER FOR MEDICARE ANNUAL WELLNESS EXAM: ICD-10-CM

## 2022-10-11 DIAGNOSIS — Z78.0 MENOPAUSE: ICD-10-CM

## 2022-10-11 DIAGNOSIS — N63.11 MASS OF UPPER OUTER QUADRANT OF RIGHT BREAST: ICD-10-CM

## 2022-10-11 PROCEDURE — G0008 ADMIN INFLUENZA VIRUS VAC: HCPCS | Performed by: INTERNAL MEDICINE

## 2022-10-11 PROCEDURE — G0439 PPPS, SUBSEQ VISIT: HCPCS | Performed by: INTERNAL MEDICINE

## 2022-10-11 PROCEDURE — 90662 IIV NO PRSV INCREASED AG IM: CPT | Performed by: INTERNAL MEDICINE

## 2022-10-11 PROCEDURE — 1159F MED LIST DOCD IN RCRD: CPT | Performed by: INTERNAL MEDICINE

## 2022-10-11 PROCEDURE — 96160 PT-FOCUSED HLTH RISK ASSMT: CPT | Performed by: INTERNAL MEDICINE

## 2022-10-11 PROCEDURE — 1170F FXNL STATUS ASSESSED: CPT | Performed by: INTERNAL MEDICINE

## 2022-10-11 PROCEDURE — 99397 PER PM REEVAL EST PAT 65+ YR: CPT | Performed by: INTERNAL MEDICINE

## 2022-10-11 NOTE — PROGRESS NOTES
The ABCs of the Annual Wellness Visit  Subsequent Medicare Wellness Visit    Chief Complaint   Patient presents with   • Medicare Wellness-subsequent   • Annual Exam      Subjective    History of Present Illness:  Teena Coe is a 82 y.o. female who presents for a Subsequent Medicare Wellness Visit and annual physical exam.  PMH of HTN, CAD, HLD, allergies, hypothyroid, GERD, dementia and COPD.  Has had 2 COVID vaccines but no booster.  Seasonal flu vaccine provided today.  No need for further colonoscopy or mammogram due to age.  BP and heart rate are well controlled today. Home reads are about the same as today or slightly higher.  She denies any CP, SOA, HA, palpitations, edema.  Patient is compliant with all medication, her  helps her with medications due to dementia. No GERD or abdominal pain.  Her  says memory is about the same.      The following portions of the patient's history were reviewed and   updated as appropriate: allergies, current medications, past family history, past medical history, past social history, past surgical history and problem list.    Compared to one year ago, the patient feels her physical   health is the same.    Compared to one year ago, the patient feels her mental   health is the same.    Recent Hospitalizations:  She was not admitted to the hospital during the last year.       Current Medical Providers:  Patient Care Team:  Vermeesch, Marilyn K, MD as PCP - General (Internal Medicine & Pediatrics)  Ayan Porter MD as Consulting Physician (Pulmonary Disease)  Radha Royal APRN as Nurse Practitioner (Pulmonary Disease)  Shaq Covington OD as Consulting Physician (Optometry)  Greg Garber MD as Consulting Physician (Dermatology)  Nayla Durand MD as Consulting Physician (Gynecology)  Rodrigo Dumont MD as Consulting Physician (Ophthalmology)    Outpatient Medications Prior to Visit   Medication Sig Dispense Refill   • albuterol sulfate  HFA (VENTOLIN HFA) 108 (90 Base) MCG/ACT inhaler Inhale 2 puffs Every 6 (Six) Hours As Needed for Wheezing or Shortness of Air. for wheezing 1 inhaler 3   • aspirin 81 MG tablet Take  by mouth Daily.     • Cholecalciferol (VITAMIN D PO) Take  by mouth Daily.     • Cyanocobalamin (VITAMIN B-12 CR PO) Take  by mouth Daily.     • donepezil (ARICEPT) 5 MG tablet TAKE 1 TABLET EVERY NIGHT 90 tablet 1   • fluticasone (FLONASE) 50 MCG/ACT nasal spray USE 1 SPRAY IN EACH NOSTRIL EVERY DAY 32 g 2   • hydroCHLOROthiazide (MICROZIDE) 12.5 MG capsule TAKE 1 TO 2 CAPSULES EVERY DAY  FOR  BLOOD  PRESSURE 180 capsule 3   • levothyroxine (SYNTHROID, LEVOTHROID) 88 MCG tablet TAKE 1 TABLET EVERY DAY 90 tablet 0   • losartan (COZAAR) 50 MG tablet Take 1 tablet by mouth Daily. 90 tablet 3   • Multiple Vitamins-Minerals (CENTRUM ADULTS PO) Take  by mouth Daily.     • pravastatin (PRAVACHOL) 80 MG tablet TAKE 1 TABLET EVERY DAY 90 tablet 3   • Spiriva Respimat 2.5 MCG/ACT aerosol solution inhaler INHALE 2 PUFFS EVERY DAY 12 g 1   • benzonatate (Tessalon Perles) 100 MG capsule Take 1 capsule by mouth 3 (Three) Times a Day As Needed for Cough. 30 capsule 0     No facility-administered medications prior to visit.       No opioid medication identified on active medication list. I have reviewed chart for other potential  high risk medication/s and harmful drug interactions in the elderly.          Aspirin is on active medication list. Aspirin use is indicated based on review of current medical condition/s. Pros and cons of this therapy have been discussed today. Benefits of this medication outweigh potential harm.  Patient has been encouraged to continue taking this medication.  .      Patient Active Problem List   Diagnosis   • Benign essential hypertension   • Chronic coronary artery disease   • Edema   • Gastroesophageal reflux disease without esophagitis   • Heart murmur   • Hyperlipidemia   • Hypothyroidism   • Seasonal allergies   •  "Encounter for Medicare annual wellness exam   • Centrilobular emphysema (HCC)   • Dementia (HCC)   • Screening for osteoporosis   • Menopause   • Mass of upper outer quadrant of right breast     Advance Care Planning  Advance Directive is on file.  ACP discussion was held with the patient during this visit. Patient has an advance directive in EMR which is still valid.     Review of Systems   Constitutional: Negative.    HENT: Negative.    Eyes: Negative.    Respiratory: Negative.    Cardiovascular: Negative.    Gastrointestinal: Negative.    Endocrine: Negative.    Genitourinary: Negative.    Musculoskeletal: Negative.    Skin: Negative.    Allergic/Immunologic: Negative.    Neurological: Negative.  Positive for confusion.   Hematological: Bruises/bleeds easily.        Objective    Vitals:    10/11/22 0950   BP: 120/64   BP Location: Left arm   Patient Position: Sitting   Cuff Size: Adult   Pulse: 64   Resp: 16   Temp: 97.8 °F (36.6 °C)   TempSrc: Temporal   SpO2: 96%   Weight: 68.9 kg (152 lb)   Height: 157.5 cm (62.01\")   PainSc: 0-No pain     Estimated body mass index is 27.79 kg/m² as calculated from the following:    Height as of this encounter: 157.5 cm (62.01\").    Weight as of this encounter: 68.9 kg (152 lb).    BMI is >= 25 and <30. (Overweight) The following options were offered after discussion;: exercise counseling/recommendations and nutrition counseling/recommendations      Does the patient have evidence of cognitive impairment? Yes    Physical Exam  Vitals and nursing note reviewed.   Constitutional:       General: She is not in acute distress.     Appearance: Normal appearance. She is well-developed. She is not ill-appearing.      Comments: Kind and pleasant female, appears stated age and in NAD today   HENT:      Head: Normocephalic and atraumatic.      Right Ear: Tympanic membrane, ear canal and external ear normal.      Left Ear: Tympanic membrane, ear canal and external ear normal.      Nose: No " congestion.      Mouth/Throat:      Pharynx: No posterior oropharyngeal erythema.   Eyes:      General:         Right eye: No discharge.         Left eye: No discharge.      Extraocular Movements: Extraocular movements intact.      Conjunctiva/sclera: Conjunctivae normal.      Pupils: Pupils are equal, round, and reactive to light.   Neck:      Thyroid: No thyromegaly.      Vascular: No carotid bruit.      Comments: No thyromegaly or mass  Cardiovascular:      Rate and Rhythm: Normal rate and regular rhythm.      Pulses: Normal pulses.      Heart sounds: Normal heart sounds. No murmur heard.  Pulmonary:      Effort: Pulmonary effort is normal. No respiratory distress.      Breath sounds: Normal breath sounds. No wheezing.   Chest:   Breasts:     Right: Mass present. No inverted nipple, nipple discharge, skin change or tenderness.      Left: Normal. No inverted nipple, mass, nipple discharge, skin change or tenderness.          Comments: Approximate 3 cm mass in right upper outer quadrant at approximate 10 o'clock position  Abdominal:      General: Bowel sounds are normal. There is no distension.      Palpations: Abdomen is soft.      Tenderness: There is no abdominal tenderness.   Musculoskeletal:         General: Normal range of motion.      Cervical back: Normal range of motion and neck supple.      Right lower leg: No edema.      Left lower leg: No edema.   Lymphadenopathy:      Cervical: No cervical adenopathy.      Upper Body:      Right upper body: No supraclavicular, axillary or pectoral adenopathy.      Left upper body: No supraclavicular, axillary or pectoral adenopathy.   Skin:     General: Skin is warm.      Findings: No rash.   Neurological:      General: No focal deficit present.      Mental Status: She is alert and oriented to person, place, and time. Mental status is at baseline.      Cranial Nerves: No cranial nerve deficit.      Motor: No weakness.      Coordination: Coordination normal.      Gait:  Gait normal.   Psychiatric:         Attention and Perception: Attention normal.         Mood and Affect: Mood normal.         Speech: Speech normal.         Behavior: Behavior normal.         Thought Content: Thought content normal.         Cognition and Memory: Cognition normal. Memory is impaired.      Comments: Patient answers most questions, however says no to everything.  Her  supplements answers as needed due to memory issues                 HEALTH RISK ASSESSMENT    Smoking Status:  Social History     Tobacco Use   Smoking Status Former   • Packs/day: 1.00   • Types: Cigarettes   Smokeless Tobacco Never   Tobacco Comments    QUIT 30 YEARS AGO     Alcohol Consumption:  Social History     Substance and Sexual Activity   Alcohol Use No     Fall Risk Screen:    STEADI Fall Risk Assessment was completed, and patient is at MODERATE risk for falls. Assessment completed on:10/11/2022    Depression Screening:  PHQ-2/PHQ-9 Depression Screening 10/11/2022   Retired PHQ-9 Total Score -   Retired Total Score -   Little Interest or Pleasure in Doing Things 0-->not at all   Feeling Down, Depressed or Hopeless 0-->not at all   PHQ-9: Brief Depression Severity Measure Score 0       Health Habits and Functional and Cognitive Screening:  Functional & Cognitive Status 10/11/2022   Do you have difficulty preparing food and eating? No   Do you have difficulty bathing yourself, getting dressed or grooming yourself? No   Do you have difficulty using the toilet? No   Do you have difficulty moving around from place to place? No   Do you have trouble with steps or getting out of a bed or a chair? No   Current Diet Well Balanced Diet   Dental Exam Up to date   Eye Exam Up to date   Exercise (times per week) 2 times per week   Current Exercises Include Walking   Current Exercise Activities Include -   Do you need help using the phone?  No   Are you deaf or do you have serious difficulty hearing?  No   Do you need help with  transportation? No   Do you need help shopping? No   Do you need help preparing meals?  No   Do you need help with housework?  No   Do you need help with laundry? No   Do you need help taking your medications? No   Do you need help managing money? Yes   Do you ever drive or ride in a car without wearing a seat belt? No   Have you felt unusual stress, anger or loneliness in the last month? No   Who do you live with? Spouse   If you need help, do you have trouble finding someone available to you? No   Have you been bothered in the last four weeks by sexual problems? No   Do you have difficulty concentrating, remembering or making decisions? Yes       Age-appropriate Screening Schedule:  Refer to the list below for future screening recommendations based on patient's age, sex and/or medical conditions. Orders for these recommended tests are listed in the plan section. The patient has been provided with a written plan.    Health Maintenance   Topic Date Due   • DXA SCAN  Never done   • LIPID PANEL  10/08/2022   • TDAP/TD VACCINES (2 - Td or Tdap) 03/31/2027   • INFLUENZA VACCINE  Completed   • MAMMOGRAM  Discontinued   • ZOSTER VACCINE  Discontinued              Assessment & Plan   CMS Preventative Services Quick Reference  Risk Factors Identified During Encounter  Dementia/Memory   Immunizations Discussed/Encouraged (specific Immunizations; Influenza  The above risks/problems have been discussed with the patient.  Follow up actions/plans if indicated are seen below in the Assessment/Plan Section.  Pertinent information has been shared with the patient in the After Visit Summary.    Diagnoses and all orders for this visit:    1. Encounter for Medicare annual wellness exam  -     CBC & Differential  -     Comprehensive Metabolic Panel  -     Lipid Panel With / Chol / HDL Ratio  -     T4, Free  -     TSH    2. Other specified hypothyroidism  -     T4, Free  -     TSH    3. Need for influenza vaccination  -     Fluzone  High-Dose 65+yrs ([id number])    4. Screening for osteoporosis  -     DEXA Bone Density Axial; Future    5. Menopause  -     DEXA Bone Density Axial; Future    6. Mass of upper outer quadrant of right breast  -     Mammo diagnostic digital tomosynthesis right w CAD; Future    Follow heart healthy/low salt/low-cholesterol diet  Avoid processed foods  Monitor blood pressure as discussed  Exercise as tolerated   Take all medications as prescribed  Continue current dose of thyroid medication, will adjust dose if needed based on labs  Continue Aricept,  feels that memory is stable on current dose  Continue daily vitamin D  DEXA scan ordered  Continue pravastatin  Continue Spiriva and albuterol, follow-up with pulmonary for underlying COPD  Right breast mass palpated on exam today, diagnostic mammogram ordered, will contact patient and  with results as soon as possible  Flu vaccine provided today,  declines patient to have COVID booster vaccine      Follow Up:   Return in about 6 months (around 4/11/2023) for Next scheduled follow up.     An After Visit Summary and PPPS were made available to the patient.

## 2022-10-12 LAB
ALBUMIN SERPL-MCNC: 4.4 G/DL (ref 3.5–5.2)
ALBUMIN/GLOB SERPL: 2.1 G/DL
ALP SERPL-CCNC: 64 U/L (ref 39–117)
ALT SERPL-CCNC: 10 U/L (ref 1–33)
AST SERPL-CCNC: 19 U/L (ref 1–32)
BASOPHILS # BLD AUTO: 0.07 10*3/MM3 (ref 0–0.2)
BASOPHILS NFR BLD AUTO: 0.8 % (ref 0–1.5)
BILIRUB SERPL-MCNC: 0.6 MG/DL (ref 0–1.2)
BUN SERPL-MCNC: 16 MG/DL (ref 8–23)
BUN/CREAT SERPL: 16.2 (ref 7–25)
CALCIUM SERPL-MCNC: 10.5 MG/DL (ref 8.6–10.5)
CHLORIDE SERPL-SCNC: 96 MMOL/L (ref 98–107)
CHOLEST SERPL-MCNC: 177 MG/DL (ref 0–200)
CHOLEST/HDLC SERPL: 2.46 {RATIO}
CO2 SERPL-SCNC: 30.3 MMOL/L (ref 22–29)
CREAT SERPL-MCNC: 0.99 MG/DL (ref 0.57–1)
EGFRCR SERPLBLD CKD-EPI 2021: 57 ML/MIN/1.73
EOSINOPHIL # BLD AUTO: 0.37 10*3/MM3 (ref 0–0.4)
EOSINOPHIL NFR BLD AUTO: 4.2 % (ref 0.3–6.2)
ERYTHROCYTE [DISTWIDTH] IN BLOOD BY AUTOMATED COUNT: 12.2 % (ref 12.3–15.4)
GLOBULIN SER CALC-MCNC: 2.1 GM/DL
GLUCOSE SERPL-MCNC: 91 MG/DL (ref 65–99)
HCT VFR BLD AUTO: 42.1 % (ref 34–46.6)
HDLC SERPL-MCNC: 72 MG/DL (ref 40–60)
HGB BLD-MCNC: 14 G/DL (ref 12–15.9)
IMM GRANULOCYTES # BLD AUTO: 0.03 10*3/MM3 (ref 0–0.05)
IMM GRANULOCYTES NFR BLD AUTO: 0.3 % (ref 0–0.5)
LDLC SERPL CALC-MCNC: 81 MG/DL (ref 0–100)
LYMPHOCYTES # BLD AUTO: 2.56 10*3/MM3 (ref 0.7–3.1)
LYMPHOCYTES NFR BLD AUTO: 28.7 % (ref 19.6–45.3)
MCH RBC QN AUTO: 31.5 PG (ref 26.6–33)
MCHC RBC AUTO-ENTMCNC: 33.3 G/DL (ref 31.5–35.7)
MCV RBC AUTO: 94.6 FL (ref 79–97)
MONOCYTES # BLD AUTO: 0.71 10*3/MM3 (ref 0.1–0.9)
MONOCYTES NFR BLD AUTO: 8 % (ref 5–12)
NEUTROPHILS # BLD AUTO: 5.17 10*3/MM3 (ref 1.7–7)
NEUTROPHILS NFR BLD AUTO: 58 % (ref 42.7–76)
NRBC BLD AUTO-RTO: 0 /100 WBC (ref 0–0.2)
PLATELET # BLD AUTO: 246 10*3/MM3 (ref 140–450)
POTASSIUM SERPL-SCNC: 4.2 MMOL/L (ref 3.5–5.2)
PROT SERPL-MCNC: 6.5 G/DL (ref 6–8.5)
RBC # BLD AUTO: 4.45 10*6/MM3 (ref 3.77–5.28)
SODIUM SERPL-SCNC: 137 MMOL/L (ref 136–145)
T4 FREE SERPL-MCNC: 1.68 NG/DL (ref 0.93–1.7)
TRIGL SERPL-MCNC: 143 MG/DL (ref 0–150)
TSH SERPL DL<=0.005 MIU/L-ACNC: 1.25 UIU/ML (ref 0.27–4.2)
VLDLC SERPL CALC-MCNC: 24 MG/DL (ref 5–40)
WBC # BLD AUTO: 8.91 10*3/MM3 (ref 3.4–10.8)

## 2022-11-15 ENCOUNTER — HOSPITAL ENCOUNTER (OUTPATIENT)
Dept: ULTRASOUND IMAGING | Facility: HOSPITAL | Age: 82
Discharge: HOME | End: 2022-11-15

## 2022-11-15 ENCOUNTER — HOSPITAL ENCOUNTER (OUTPATIENT)
Dept: MAMMOGRAPHY | Facility: HOSPITAL | Age: 82
Discharge: HOME | End: 2022-11-15

## 2022-11-15 ENCOUNTER — TELEPHONE (OUTPATIENT)
Dept: INTERNAL MEDICINE | Facility: CLINIC | Age: 82
End: 2022-11-15

## 2022-11-15 ENCOUNTER — APPOINTMENT (OUTPATIENT)
Dept: BONE DENSITY | Facility: HOSPITAL | Age: 82
End: 2022-11-15

## 2022-11-15 DIAGNOSIS — Z13.820 SCREENING FOR OSTEOPOROSIS: ICD-10-CM

## 2022-11-15 DIAGNOSIS — N63.11 MASS OF UPPER OUTER QUADRANT OF RIGHT BREAST: ICD-10-CM

## 2022-11-15 DIAGNOSIS — Z78.0 MENOPAUSE: ICD-10-CM

## 2022-11-15 PROCEDURE — 77066 DX MAMMO INCL CAD BI: CPT

## 2022-11-15 PROCEDURE — 76642 ULTRASOUND BREAST LIMITED: CPT

## 2022-11-15 PROCEDURE — G0279 TOMOSYNTHESIS, MAMMO: HCPCS

## 2022-11-15 PROCEDURE — 77080 DXA BONE DENSITY AXIAL: CPT

## 2022-11-15 NOTE — TELEPHONE ENCOUNTER
Caller: Phoenix Coe    Relationship: Emergency Contact    Best call back number:     Requested Prescriptions:    SOMETHING FOR BAD COUGH AND MUCUS     Pharmacy where request should be sent: [facility] [address] [place] [address] [phone] [facility] [phone]      Additional details provided by patient: PATIENT THINKS MAYBE THE FLU DUE TO  HAVING IT EARLY LAST WEEK. COULD NOT GET APPOINTMENT UNTIL Friday. REQUESTING MEDICATION BE CALLED IN BEFORE THEN IF POSSIBLE. PLEASE CALL TO LET THEM KNOW IF IT WAS CALLED IN      Does the patient have less than a 3 day supply:  [x] Yes  [] No    Virgen Bernabe Rep   11/15/22 16:18 EST

## 2022-11-16 NOTE — TELEPHONE ENCOUNTER
Spoke with  per signed release, advised patient needs to be seen.  Has a pending appt 11-18-22 advised to keep appt.

## 2022-11-18 ENCOUNTER — OFFICE VISIT (OUTPATIENT)
Dept: INTERNAL MEDICINE | Facility: CLINIC | Age: 82
End: 2022-11-18

## 2022-11-18 VITALS
OXYGEN SATURATION: 100 % | WEIGHT: 145 LBS | HEIGHT: 62 IN | HEART RATE: 68 BPM | SYSTOLIC BLOOD PRESSURE: 138 MMHG | DIASTOLIC BLOOD PRESSURE: 76 MMHG | TEMPERATURE: 97.1 F | BODY MASS INDEX: 26.68 KG/M2

## 2022-11-18 DIAGNOSIS — J44.1 COPD EXACERBATION: Primary | ICD-10-CM

## 2022-11-18 LAB
EXPIRATION DATE: NORMAL
FLUAV AG UPPER RESP QL IA.RAPID: NOT DETECTED
FLUBV AG UPPER RESP QL IA.RAPID: NOT DETECTED
INTERNAL CONTROL: NORMAL
Lab: NORMAL
SARS-COV-2 AG UPPER RESP QL IA.RAPID: NOT DETECTED

## 2022-11-18 PROCEDURE — 99213 OFFICE O/P EST LOW 20 MIN: CPT | Performed by: NURSE PRACTITIONER

## 2022-11-18 PROCEDURE — 87428 SARSCOV & INF VIR A&B AG IA: CPT | Performed by: NURSE PRACTITIONER

## 2022-11-18 RX ORDER — AMOXICILLIN AND CLAVULANATE POTASSIUM 875; 125 MG/1; MG/1
1 TABLET, FILM COATED ORAL 2 TIMES DAILY
Qty: 14 TABLET | Refills: 0 | Status: SHIPPED | OUTPATIENT
Start: 2022-11-18 | End: 2022-11-25

## 2022-11-18 RX ORDER — AMOXICILLIN AND CLAVULANATE POTASSIUM 875; 125 MG/1; MG/1
1 TABLET, FILM COATED ORAL 2 TIMES DAILY
Qty: 14 TABLET | Refills: 0 | Status: SHIPPED | OUTPATIENT
Start: 2022-11-18 | End: 2022-11-18

## 2022-11-18 RX ORDER — PREDNISONE 20 MG/1
40 TABLET ORAL DAILY
Qty: 10 TABLET | Refills: 0 | Status: SHIPPED | OUTPATIENT
Start: 2022-11-18 | End: 2022-11-18

## 2022-11-18 RX ORDER — PREDNISONE 20 MG/1
40 TABLET ORAL DAILY
Qty: 10 TABLET | Refills: 0 | Status: SHIPPED | OUTPATIENT
Start: 2022-11-18 | End: 2022-11-23

## 2022-11-18 NOTE — PROGRESS NOTES
"  Office Visit      Patient Name: Teena Coe  : 1940   MRN: [id number]   Care Team: Patient Care Team:  Vermeesch, Marilyn K, MD as PCP - General (Internal Medicine & Pediatrics)  Ayan Porter MD as Consulting Physician (Pulmonary Disease)  Radha Royal APRN as Nurse Practitioner (Pulmonary Disease)  Shaq Covington OD as Consulting Physician (Optometry)  Greg Garber MD as Consulting Physician (Dermatology)  Nayla Durand MD as Consulting Physician (Gynecology)  Rodrigo Dumont MD as Consulting Physician (Ophthalmology)    Chief Complaint  Cough (Started middle of last week. Exposed to flu from ) and URI (More chest congestion)    Subjective     Subjective      Teena Coe presents to [facility] for URI symptoms.   Started about 1.5 weeks ago.   Endorses productive cough with green sputum, fatigue, dyspnea, and wheezing.    Denies body aches, chest pain, fever, chills, nausea, diarrhea, and vomiting.  She has taken mucinex- helped symptoms some.   Feels like she is not worsening but not majorly improved.   just got over the flu.   She does suffer from baseline COPD and is compliant with her Spiriva.    Review of Systems   Constitutional: Positive for fatigue. Negative for chills and fever.   HENT: Positive for congestion. Negative for postnasal drip, sinus pressure, sneezing, sore throat, swollen glands and trouble swallowing.    Respiratory: Positive for cough, shortness of breath and wheezing.    Cardiovascular: Negative for chest pain.   Gastrointestinal: Negative for abdominal pain, diarrhea, nausea and vomiting.   Neurological: Negative for headache.   Psychiatric/Behavioral: Negative for sleep disturbance.       Objective     Objective   Vital Signs:   /76   Pulse 68   Temp 97.1 °F (36.2 °C)   Ht 157.5 cm (62\")   Wt 65.8 kg (145 lb)   SpO2 100%   BMI 26.52 kg/m²     Physical Exam  Vitals and nursing note " reviewed.   Constitutional:       General: She is not in acute distress.     Appearance: Normal appearance. She is not ill-appearing or toxic-appearing.   HENT:      Right Ear: Tympanic membrane and ear canal normal. No middle ear effusion. Tympanic membrane is not bulging.      Left Ear: Tympanic membrane and ear canal normal.  No middle ear effusion. Tympanic membrane is not bulging.      Nose: Nose normal. No congestion or rhinorrhea.      Right Sinus: No maxillary sinus tenderness or frontal sinus tenderness.      Left Sinus: No maxillary sinus tenderness or frontal sinus tenderness.      Mouth/Throat:      Mouth: Mucous membranes are moist.      Pharynx: Posterior oropharyngeal erythema (Mild) present.   Eyes:      Pupils: Pupils are equal, round, and reactive to light.   Cardiovascular:      Rate and Rhythm: Normal rate and regular rhythm.      Heart sounds: Normal heart sounds. No murmur heard.  Pulmonary:      Effort: Pulmonary effort is normal. No respiratory distress.      Breath sounds: Wheezing (Significant wheezing throughout lung fields) present.   Abdominal:      General: Bowel sounds are normal. There is no distension.      Palpations: Abdomen is soft.      Tenderness: There is no abdominal tenderness.   Musculoskeletal:      Cervical back: Neck supple. No tenderness.   Lymphadenopathy:      Head:      Right side of head: Tonsillar adenopathy present. No submental or submandibular adenopathy.      Left side of head: Tonsillar adenopathy present. No submental or submandibular adenopathy.      Cervical: No cervical adenopathy.   Skin:     General: Skin is warm and dry.      Findings: No rash.   Neurological:      Mental Status: She is alert.       Assessment / Plan      Assessment & Plan   Problem List Items Addressed This Visit    None  Visit Diagnoses     COPD exacerbation (HCC)    -  Primary    Relevant Medications    predniSONE (DELTASONE) 20 MG tablet    Symptoms consistent with acute COPD  exacerbation, and could have been contributed by flu however flu and COVID in the office today are negative.  Recommend Augmentin twice daily with food, steroid burst, rest, push fluids, continue Spiriva as prescribed.  Strict return precautions discussed.  Follow-up as needed.         Follow Up   Return if symptoms worsen or fail to improve.  Patient was given instructions and counseling regarding her condition or for health maintenance advice. Please see specific information pulled into the AVS if appropriate.     ALIZE Ko  [facility] [place]

## 2022-12-07 ENCOUNTER — APPOINTMENT (OUTPATIENT)
Dept: BONE DENSITY | Facility: HOSPITAL | Age: 82
End: 2022-12-07

## 2022-12-07 ENCOUNTER — HOSPITAL ENCOUNTER (OUTPATIENT)
Dept: MAMMOGRAPHY | Facility: HOSPITAL | Age: 82
End: 2022-12-07

## 2023-01-30 RX ORDER — LEVOTHYROXINE SODIUM 88 UG/1
TABLET ORAL
Qty: 90 TABLET | Refills: 1 | Status: SHIPPED | OUTPATIENT
Start: 2023-01-30

## 2023-05-05 ENCOUNTER — HOSPITAL ENCOUNTER (EMERGENCY)
Facility: HOSPITAL | Age: 83
Discharge: HOME | End: 2023-05-06
Attending: EMERGENCY MEDICINE
Payer: MEDICARE

## 2023-05-05 ENCOUNTER — APPOINTMENT (OUTPATIENT)
Dept: GENERAL RADIOLOGY | Facility: HOSPITAL | Age: 83
End: 2023-05-05
Payer: MEDICARE

## 2023-05-05 VITALS
TEMPERATURE: 97.9 F | OXYGEN SATURATION: 96 % | HEART RATE: 79 BPM | SYSTOLIC BLOOD PRESSURE: 193 MMHG | RESPIRATION RATE: 18 BRPM | DIASTOLIC BLOOD PRESSURE: 78 MMHG | WEIGHT: 150 LBS | HEIGHT: 63 IN | BODY MASS INDEX: 26.58 KG/M2

## 2023-05-05 DIAGNOSIS — S61.212A LACERATION OF RIGHT MIDDLE FINGER WITHOUT FOREIGN BODY WITHOUT DAMAGE TO NAIL, INITIAL ENCOUNTER: Primary | ICD-10-CM

## 2023-05-05 DIAGNOSIS — S61.214A LACERATION OF RIGHT RING FINGER WITHOUT FOREIGN BODY WITHOUT DAMAGE TO NAIL, INITIAL ENCOUNTER: ICD-10-CM

## 2023-05-05 PROCEDURE — 73130 X-RAY EXAM OF HAND: CPT

## 2023-05-05 PROCEDURE — 90715 TDAP VACCINE 7 YRS/> IM: CPT | Performed by: EMERGENCY MEDICINE

## 2023-05-05 PROCEDURE — 25010000002 TETANUS-DIPHTH-ACELL PERTUSSIS 5-2.5-18.5 LF-MCG/0.5 SUSPENSION PREFILLED SYRINGE: Performed by: EMERGENCY MEDICINE

## 2023-05-05 PROCEDURE — 90471 IMMUNIZATION ADMIN: CPT | Performed by: EMERGENCY MEDICINE

## 2023-05-05 PROCEDURE — 99283 EMERGENCY DEPT VISIT LOW MDM: CPT

## 2023-05-05 RX ORDER — CEPHALEXIN 250 MG/1
500 CAPSULE ORAL ONCE
Status: COMPLETED | OUTPATIENT
Start: 2023-05-05 | End: 2023-05-05

## 2023-05-05 RX ADMIN — TETANUS TOXOID, REDUCED DIPHTHERIA TOXOID AND ACELLULAR PERTUSSIS VACCINE, ADSORBED 0.5 ML: 5; 2.5; 8; 8; 2.5 SUSPENSION INTRAMUSCULAR at 23:09

## 2023-05-05 RX ADMIN — CEPHALEXIN 500 MG: 250 CAPSULE ORAL at 23:09

## 2023-05-06 RX ORDER — CEPHALEXIN 500 MG/1
500 CAPSULE ORAL 3 TIMES DAILY
Qty: 15 CAPSULE | Refills: 0 | Status: SHIPPED | OUTPATIENT
Start: 2023-05-06

## 2023-05-06 RX ORDER — BACITRACIN ZINC 500 [USP'U]/G
1 OINTMENT TOPICAL ONCE
Status: COMPLETED | OUTPATIENT
Start: 2023-05-06 | End: 2023-05-06

## 2023-05-06 RX ADMIN — BACITRACIN ZINC 1 APPLICATION: 500 OINTMENT TOPICAL at 01:04

## 2023-05-10 NOTE — ED PROVIDER NOTES
Subjective  History of Present Illness:    Chief Complaint: Finger lacerations  History of Present Illness: 83-year-old female presents the day after sustaining lacerations to the right third and fourth finger, she was working in the garden and stumbled backwards and caught her hand on a fence    Nurses Notes reviewed and agree, including vitals, allergies, social history and prior medical history.     REVIEW OF SYSTEMS: All systems reviewed and not pertinent unless noted.  Review of Systems      Positive for: Lacerations and contusions of the right third and fourth finger    Negative for: Weakness numbness    Past Medical History:   Diagnosis Date   • Arthritis    • Disease of thyroid gland    • Emphysema of lung    • Full dentures    • GERD (gastroesophageal reflux disease)    • Heart murmur    • History of cataract    • History of TB (tuberculosis)     As a child.  Pt cannot recall treatment recieved.   • Hyperlipidemia    • Hypertension    • Hypothyroid    • Wears glasses        Allergies:    Patient has no known allergies.      Past Surgical History:   Procedure Laterality Date   • CATARACT EXTRACTION W/ INTRAOCULAR LENS IMPLANT Left 10/05/2017    Procedure: CATARACT PHACO EXTRACTION WITH INTRAOCULAR LENS IMPLANT LEFT;  Surgeon: Rodrigo Dumont MD;  Location: [facility];  Service:    • CATARACT EXTRACTION W/ INTRAOCULAR LENS IMPLANT Right 10/19/2017    Procedure: CATARACT PHACO EXTRACTION WITH INTRAOCULAR LENS IMPLANT RIGHT;  Surgeon: Rodrigo Dumont MD;  Location: [facility];  Service:    • COLONOSCOPY     • MULTIPLE TOOTH EXTRACTIONS           Social History     Socioeconomic History   • Marital status:    Tobacco Use   • Smoking status: Former     Packs/day: 1.00     Years: 30.00     Pack years: 30.00     Types: Cigarettes     Quit date: 1980     Years since quittin.3   • Smokeless tobacco: Never   • Tobacco comments:     QUIT 30 YEARS AGO   Substance and Sexual Activity   • Alcohol use: No   • Drug use:  "No   • Sexual activity: Not Currently         Family History   Problem Relation Age of Onset   • Diabetes Brother    • Breast cancer Daughter 52       Objective  Physical Exam:  BP (!) 193/78 (BP Location: Left arm, Patient Position: Sitting)   Pulse 79   Temp 97.9 °F (36.6 °C) (Oral)   Resp 18   Ht 160 cm (63\")   Wt 68 kg (150 lb)   SpO2 96%   BMI 26.57 kg/m²      Physical Exam    CONSTITUTIONAL: Well developed, nontoxic 83-year-old female,  in no acute distress.  VITAL SIGNS: per nursing, reviewed and noted  SKIN: exposed skin with no rashes, ulcerations or petechiae crescent shaped laceration to the palmar aspect of proximal phalanx of the third and fourth finger approximately centimeter and a half each.  No cellulitic changes.  Good hemostasis.  Mild contusions to the second fourth finger proximal phalanx palmar aspects as well  EYES: Grossly EOMI, no icterus  ENT: Normal voice.  Moist mucous membranes   RESPIRATORY:  No increased work of breathing. No retractions.   CARDIOVASCULAR:   Extremities pink and warm.  Good cap refill to extremities.   MUSCULOSKELETAL: Age-appropriate bulk and tone, moves all 4 extremities  NEUROLOGIC: Alert, oriented x 3. No gross deficits. GCS 15.   PSYCH: appropriate affect.    Procedures    ED Course:    Lab Results (last 24 hours)     ** No results found for the last 24 hours. **           No radiology results from the last 24 hrs     MDM         Medical Decision Making:    Initial impression of presenting illness: Patient presents greater than 24 hours after sustaining lacerations to the right hand involving the third and fourth fingers    DDX: includes but is not limited to: Foreign body fracture         Patient arrives hemodynamically stable although hypertensive 193/78 with vitals interpreted by myself.     Pertinent features from physical exam: crescent shaped laceration to the palmar aspect of proximal phalanx of the third and fourth finger approximately centimeter " and a half each.  No cellulitic changes.  Good hemostasis.  Mild contusions to the second fourth finger proximal phalanx palmar aspects as well  .  Neuro intact    Initial diagnostic plan: Plain films, update tetanus, wound care with bacitracin, deferred imaging due to timeframe of presentation    Results from initial plan were reviewed and interpreted by me revealing no acute bony abnormality on plain films    Diagnostic information from other sources: None      Results/clinical rationale were discussed with patient and family member at the bedside    Consultations/Discussion of results with other physicians: None    Disposition plan: Patient was discharged in home stable condition.  Counseled on supportive care, outpatient follow-up. Return precaution discussed.  Patient/family was understanding and agreeable with plan    -----  Prescription Keflex for prophylaxis    Final diagnoses:   Laceration of right middle finger without foreign body without damage to nail, initial encounter   Laceration of right ring finger without foreign body without damage to nail, initial encounter        Remi Gao DO  05/10/23 0736

## 2023-05-17 RX ORDER — FLUTICASONE PROPIONATE 50 MCG
SPRAY, SUSPENSION (ML) NASAL
Qty: 32 G | Refills: 2 | Status: SHIPPED | OUTPATIENT
Start: 2023-05-17

## 2023-06-14 RX ORDER — TIOTROPIUM BROMIDE INHALATION SPRAY 3.12 UG/1
SPRAY, METERED RESPIRATORY (INHALATION)
Qty: 12 G | Refills: 1 | OUTPATIENT
Start: 2023-06-14

## 2024-05-16 ENCOUNTER — OFFICE VISIT (OUTPATIENT)
Dept: PULMONOLOGY | Facility: CLINIC | Age: 84
End: 2024-05-16
Payer: MEDICARE

## 2024-05-16 VITALS
DIASTOLIC BLOOD PRESSURE: 72 MMHG | WEIGHT: 140 LBS | HEART RATE: 79 BPM | RESPIRATION RATE: 14 BRPM | HEIGHT: 64 IN | SYSTOLIC BLOOD PRESSURE: 122 MMHG | BODY MASS INDEX: 23.9 KG/M2 | OXYGEN SATURATION: 94 %

## 2024-05-16 DIAGNOSIS — R91.8 ABNORMAL CT SCAN OF LUNG: Primary | ICD-10-CM

## 2024-05-16 DIAGNOSIS — J44.9 CHRONIC OBSTRUCTIVE PULMONARY DISEASE, UNSPECIFIED COPD TYPE: ICD-10-CM

## 2024-05-16 PROCEDURE — 3074F SYST BP LT 130 MM HG: CPT | Performed by: NURSE PRACTITIONER

## 2024-05-16 PROCEDURE — 3078F DIAST BP <80 MM HG: CPT | Performed by: NURSE PRACTITIONER

## 2024-05-16 PROCEDURE — 99214 OFFICE O/P EST MOD 30 MIN: CPT | Performed by: NURSE PRACTITIONER

## 2024-05-16 RX ORDER — TIOTROPIUM BROMIDE INHALATION SPRAY 3.12 UG/1
2 SPRAY, METERED RESPIRATORY (INHALATION)
Qty: 12 G | Refills: 2 | Status: SHIPPED | OUTPATIENT
Start: 2024-05-16

## 2024-05-16 RX ORDER — TIOTROPIUM BROMIDE INHALATION SPRAY 3.12 UG/1
2 SPRAY, METERED RESPIRATORY (INHALATION)
Qty: 12 G | Refills: 2 | Status: SHIPPED | OUTPATIENT
Start: 2024-05-16 | End: 2024-05-16 | Stop reason: SDUPTHER

## 2024-05-16 RX ORDER — MEMANTINE HYDROCHLORIDE 10 MG/1
1 TABLET ORAL 2 TIMES DAILY
COMMUNITY

## 2024-05-16 NOTE — PROGRESS NOTES
"Chief Complaint   Patient presents with    Shortness of Breath    Follow-up         Subjective   Teena Coe is a 84 y.o. female.   The patient comes in today due to lung mass and referred from PCP.    The patient is a poor historian and all of the information comes from her . Her  states she has some memory issues.     He states that she had a bad cough for about a month and he took her to PCP and she was treated with 5 days of antibiotics.  The cough resolved after that.  She has some occasional cough but not as it was previously.  CT chest was done due to the cough and the abnormality noted.    He states she may not have taken her Spiriva for several days and he thought that was possibly why her cough became so bad.    She uses Spiriva daily. Her  helps her take it.     She has some occasional wheezing.     She does not use oxygen.  She was prescribed oxygen at night and with activity in the past and refused to use it.      The following portions of the patient's history were reviewed and updated as appropriate: allergies, current medications, past family history, past medical history, past social history, and past surgical history.    Review of Systems   HENT:  Negative for sinus pressure, sneezing and sore throat.    Respiratory:  Positive for cough and wheezing. Negative for chest tightness and shortness of breath.    All other systems reviewed and are negative.      Objective   Visit Vitals  /72   Pulse 79   Resp 14   Ht 162.6 cm (64\") Comment: pt reported   Wt 63.5 kg (140 lb)   SpO2 94%   BMI 24.03 kg/m²         Physical Exam  Vitals reviewed.   HENT:      Head: Atraumatic.   Eyes:      Extraocular Movements: Extraocular movements intact.   Cardiovascular:      Rate and Rhythm: Normal rate and regular rhythm.   Pulmonary:      Effort: Pulmonary effort is normal. No respiratory distress.      Comments: Somewhat decreased A/E with minimal expiratory wheezing.   Musculoskeletal:    "   Comments: Shuffling gait.   Skin:     General: Skin is warm.   Neurological:      Mental Status: She is alert and oriented to person, place, and time.           Assessment & Plan   Diagnoses and all orders for this visit:    1. Abnormal CT scan of lung (Primary)    2. Chronic obstructive pulmonary disease, unspecified COPD type    Other orders  -     Discontinue: tiotropium bromide monohydrate (Spiriva Respimat) 2.5 MCG/ACT aerosol solution inhaler; Inhale 2 puffs Daily.  Dispense: 12 g; Refill: 2  -     tiotropium bromide monohydrate (Spiriva Respimat) 2.5 MCG/ACT aerosol solution inhaler; Inhale 2 puffs Daily.  Dispense: 12 g; Refill: 2           Return for Update PCP!, keep appt in August for now.    DISCUSSION (if any):  PFT 2019 consistent with no obstruction. However, it was not acceptable per ATS criteria.     I have asked her to continue Spiriva daily.    She denies any worsening SOB. Her  states since the antibiotic the coughing has resolved.     I have discussed the CT result and reviewed the images with the patient and her . I have discussed the need for possible bronchoscopy and briefly explained the procedure and risks of the procedure.    Given the patient's advanced age and improvement of symptoms after antibiotics the patient's  is hesitant for bronchoscopy.    I discussed with the patient and her  that I reached out to radiology to compare the most recent CT from May 2024 to her previous CT from 2019.  I suggested that we wait for the radiologist comparison to make a decision if bronchoscopy is needed.  I will call the patient/patient's  after getting an update from radiology and decide at that time if bronchoscopy is needed and wanted. The patient's  is in agreement with this plan.    Dr. Porter and I reviewed new CT images, which were power shared from [facility], and compared the new images with CT images from 2019. The images are similar, but  there may be some differences. I have asked our radiologist to compare the new CT images from May 2024 with the CT images from 2019 to see if there are new findings.     The CT shows cystic bronchiectasis.  This was also noted in 2019 but since the CTs were done at 2 different facilities a comparison was not made.          Dictated utilizing Dragon dictation.    This document was electronically signed by ALIZE Vale May 16, 2024  13:00 EDT

## 2024-08-13 DIAGNOSIS — R91.8 ABNORMAL CT SCAN OF LUNG: Primary | ICD-10-CM

## 2024-08-14 ENCOUNTER — TELEPHONE (OUTPATIENT)
Dept: PULMONOLOGY | Facility: CLINIC | Age: 84
End: 2024-08-14
Payer: MEDICARE

## 2024-08-14 NOTE — TELEPHONE ENCOUNTER
LVM for patient regarding Radha ordering a  CT scan of the chest so that we can compare it to the previous. Also left reminder for patient to keep appointment on 8/19/24 with Dr. Porter.

## 2024-08-19 ENCOUNTER — HOSPITAL ENCOUNTER (OUTPATIENT)
Dept: CT IMAGING | Facility: HOSPITAL | Age: 84
Discharge: HOME | End: 2024-08-19
Admitting: NURSE PRACTITIONER
Payer: MEDICARE

## 2024-08-19 ENCOUNTER — OFFICE VISIT (OUTPATIENT)
Dept: PULMONOLOGY | Facility: CLINIC | Age: 84
End: 2024-08-19
Payer: MEDICARE

## 2024-08-19 VITALS
HEIGHT: 64 IN | BODY MASS INDEX: 22.36 KG/M2 | DIASTOLIC BLOOD PRESSURE: 60 MMHG | OXYGEN SATURATION: 96 % | SYSTOLIC BLOOD PRESSURE: 116 MMHG | HEART RATE: 82 BPM | RESPIRATION RATE: 14 BRPM | WEIGHT: 131 LBS

## 2024-08-19 DIAGNOSIS — J47.9 BRONCHIECTASIS WITHOUT ACUTE EXACERBATION: ICD-10-CM

## 2024-08-19 DIAGNOSIS — R91.8 ABNORMAL CT SCAN OF LUNG: ICD-10-CM

## 2024-08-19 DIAGNOSIS — J44.9 CHRONIC OBSTRUCTIVE PULMONARY DISEASE, UNSPECIFIED COPD TYPE: Primary | ICD-10-CM

## 2024-08-19 PROCEDURE — 71250 CT THORAX DX C-: CPT

## 2024-08-19 PROCEDURE — 99214 OFFICE O/P EST MOD 30 MIN: CPT | Performed by: INTERNAL MEDICINE

## 2024-08-19 PROCEDURE — 3078F DIAST BP <80 MM HG: CPT | Performed by: INTERNAL MEDICINE

## 2024-08-19 PROCEDURE — 3074F SYST BP LT 130 MM HG: CPT | Performed by: INTERNAL MEDICINE

## 2024-08-19 RX ORDER — TIOTROPIUM BROMIDE INHALATION SPRAY 3.12 UG/1
2 SPRAY, METERED RESPIRATORY (INHALATION)
Qty: 12 G | Refills: 3 | Status: SHIPPED | OUTPATIENT
Start: 2024-08-19

## 2024-08-19 RX ORDER — LOSARTAN POTASSIUM 100 MG/1
TABLET ORAL
COMMUNITY
Start: 2024-06-07

## 2024-08-19 NOTE — PROGRESS NOTES
"  Chief Complaint   Patient presents with    Breathing Problem    Follow-up         Subjective   Teena Coe is a 84 y.o. female.   Patient comes in today for follow-up on COPD and abnormal CT of the chest.    The patient is somewhat of a poor historian.  The patient does appear to have an element of dementia and the history was obtained from the patient's .    The patient has been says that since she was started on Spiriva, he has noticed at least some improvement with regards to shortness of breath.    He denies any ongoing issues with cough etc.    The following portions of the patient's history were reviewed and updated as appropriate: allergies, current medications, past family history, past medical history, past social history, and past surgical history.    Review of Systems   HENT:  Positive for sneezing. Negative for sinus pressure and sore throat.    Respiratory:  Positive for cough. Negative for chest tightness, shortness of breath and wheezing.        Objective   Visit Vitals  /60   Pulse 82   Resp 14   Ht 162.6 cm (64\") Comment: pt reported   Wt 59.4 kg (131 lb)   SpO2 96%   BMI 22.49 kg/m²       BMI Readings from Last 8 Encounters:   08/19/24 22.49 kg/m²   05/16/24 24.03 kg/m²   05/05/23 26.57 kg/m²   11/18/22 26.52 kg/m²   10/11/22 27.79 kg/m²   05/03/22 26.70 kg/m²   04/08/22 27.06 kg/m²   11/23/21 27.57 kg/m²       Physical Exam  Vitals reviewed.   Constitutional:       Appearance: She is well-developed.   HENT:      Head: Normocephalic and atraumatic.   Eyes:      Extraocular Movements: Extraocular movements intact.   Cardiovascular:      Rate and Rhythm: Normal rate.      Heart sounds: Murmur heard.   Pulmonary:      Comments: Somewhat hyperresonant to percussion.  Somewhat decreased air entry.  No obvious wheezing noted.   Musculoskeletal:      Cervical back: Neck supple.      Comments: Gait was slow.   Neurological:      Mental Status: She is alert.           Assessment & Plan "   Diagnoses and all orders for this visit:    1. Chronic obstructive pulmonary disease, unspecified COPD type (Primary)    2. Abnormal CT scan of lung    3. Bronchiectasis without acute exacerbation    Other orders  -     tiotropium bromide monohydrate (Spiriva Respimat) 2.5 MCG/ACT aerosol solution inhaler; Inhale 2 puffs Daily.  Dispense: 12 g; Refill: 3           Return in about 5 months (around 1/19/2025) for Recheck, Imaging, For Radha Ramos).    DISCUSSION (if any):  Last CT scan results was reviewed in great detail with the patient.  Results for orders placed during the hospital encounter of 03/13/19    CT Chest Without Contrast    Narrative  CT CHEST    INDICATION: Persistent cough.    FINDINGS: Axial imaging through the chest without intravenous contrast.  No previous CT. Correlation with plain radiographs dated 05/10/2012 and  06/15/2011. This study was performed with techniques to keep radiation  doses as low as reasonably achievable, (ALARA).    Evaluation is somewhat degraded by the absence of intravenous contrast.  Heart size is normal. No pleural or pericardial effusion. Scattered  vascular calcifications. Other calcifications are likely due to old  granulomatous disease. Scattered normal size lymph nodes without areas  of adenopathy identified by size criteria. Visualized portions of the  upper abdomen demonstrate no acute abnormality allowing for patient  motion. Lung window images demonstrate no pneumothorax. These are  degraded by patient motion related artifact. Multiple areas of  bronchiectasis are present bilaterally as well as curvilinear densities  and areas of calcification likely due to scarring. These appear very  similar to the previous radiographs allowing for differences in  modality. Left apical pleural thickening also appears stable. No new  areas of consolidation. Moderate degenerative changes in the chest.    Impression  Allowing for differences in modality, findings are  favored to be chronic  and similar dating back to chest radiograph 06/15/2011.    This report was finalized on 3/13/2019 3:35 PM by Deng Albert MD.      Latest available PFTs were reviewed.  Consistent with no significant obstruction with mild restriction.  Poorly performed in 2019.    PFTs may be considered upon follow up.    We have reviewed her pulmonary medications in great detail.    Compliance with medications stressed.     Side effects of prescribed medications discussed with the patient    We will schedule repeat CT to be performed soon.    If the CT remains abnormal, even then the patient and  do not want to pursue any intervention but do want to discuss the CT.    I did mention the possibility of bronchoscopy but the patient's  was extremely hesitant but is willing to discuss this if absolutely necessary.    The patient's  also does not want to perform overnight pulse oximetry, as he does not think that she will use oxygen.      Dictated utilizing Dragon dictation.    This document was electronically signed by Ayan Porter MD on 08/19/24 at 10:58 EDT

## 2024-12-23 ENCOUNTER — OFFICE VISIT (OUTPATIENT)
Dept: PULMONOLOGY | Facility: CLINIC | Age: 84
End: 2024-12-23
Payer: MEDICARE

## 2024-12-23 VITALS
HEIGHT: 64 IN | HEART RATE: 60 BPM | RESPIRATION RATE: 18 BRPM | SYSTOLIC BLOOD PRESSURE: 124 MMHG | WEIGHT: 129 LBS | DIASTOLIC BLOOD PRESSURE: 68 MMHG | BODY MASS INDEX: 22.02 KG/M2

## 2024-12-23 DIAGNOSIS — J47.9 BRONCHIECTASIS WITHOUT ACUTE EXACERBATION: ICD-10-CM

## 2024-12-23 DIAGNOSIS — J44.9 CHRONIC OBSTRUCTIVE PULMONARY DISEASE, UNSPECIFIED COPD TYPE: Primary | ICD-10-CM

## 2024-12-23 DIAGNOSIS — J30.9 ALLERGIC RHINITIS, UNSPECIFIED SEASONALITY, UNSPECIFIED TRIGGER: ICD-10-CM

## 2024-12-23 PROCEDURE — 3074F SYST BP LT 130 MM HG: CPT | Performed by: NURSE PRACTITIONER

## 2024-12-23 PROCEDURE — 99214 OFFICE O/P EST MOD 30 MIN: CPT | Performed by: NURSE PRACTITIONER

## 2024-12-23 PROCEDURE — 3078F DIAST BP <80 MM HG: CPT | Performed by: NURSE PRACTITIONER

## 2024-12-23 RX ORDER — FLUTICASONE PROPIONATE 50 MCG
1 SPRAY, SUSPENSION (ML) NASAL DAILY
Qty: 18 G | Refills: 2 | Status: SHIPPED | OUTPATIENT
Start: 2024-12-23

## 2024-12-23 RX ORDER — TIOTROPIUM BROMIDE INHALATION SPRAY 3.12 UG/1
2 SPRAY, METERED RESPIRATORY (INHALATION)
Qty: 12 G | Refills: 3 | Status: SHIPPED | OUTPATIENT
Start: 2024-12-23

## 2024-12-23 NOTE — PROGRESS NOTES
"Chief Complaint   Patient presents with    Breathing Problem    Follow-up         Subjective   Teena Coe is a 84 y.o. female.   Patient comes today for follow up of shortness of breath and COPD.     Symptoms have been stable since the last clinic visit. Patient reports no recent exacerbations.     Patient is using medications, as prescribed. She uses spiriva daily.     She has not been using nasal spray but has some cough and drainage per .     Exercise tolerance has also remained stable.     Quit smoking over 20  years ago.       The following portions of the patient's history were reviewed and updated as appropriate: allergies, current medications, past family history, past medical history, past social history, and past surgical history.    Review of Systems   HENT:  Negative for sinus pressure, sneezing and sore throat.    Respiratory:  Negative for cough, chest tightness, shortness of breath and wheezing.        Objective   Visit Vitals  /68   Pulse 60   Resp 18   Ht 162.6 cm (64\") Comment: pt reported   Wt 58.5 kg (129 lb)   BMI 22.14 kg/m²   HR manually checked at 73 bpm    Her fingers are cold and nails very long and not sure pulse ox is accurate.     O2 sat on room air at rest initially 70%. Warmed fingers and O2 sat on room air increased to 78%. HR was steady in 60's and 70's.    Addition of 2 LPM oxygen 02 sat increased to 92% after 2-3 minutes.       Physical Exam  Vitals reviewed.   HENT:      Head: Atraumatic.      Mouth/Throat:      Mouth: Mucous membranes are moist.   Eyes:      Extraocular Movements: Extraocular movements intact.   Cardiovascular:      Rate and Rhythm: Normal rate and regular rhythm.   Pulmonary:      Effort: Pulmonary effort is normal. No respiratory distress.      Breath sounds: No wheezing.      Comments: Somewhat decreased A/E without wheezing.   Skin:     General: Skin is warm.   Neurological:      Mental Status: She is alert and oriented to person, place, and " time.             Assessment & Plan   Diagnoses and all orders for this visit:    1. Chronic obstructive pulmonary disease, unspecified COPD type (Primary)  -     Oxygen Therapy    2. Bronchiectasis without acute exacerbation  -     Oxygen Therapy    3. Allergic rhinitis, unspecified seasonality, unspecified trigger    Other orders  -     fluticasone (FLONASE) 50 MCG/ACT nasal spray; Administer 1 spray into the nostril(s) as directed by provider Daily. Administer 1 spray in each nostril for each dose.  Dispense: 18 g; Refill: 2  -     tiotropium bromide monohydrate (Spiriva Respimat) 2.5 MCG/ACT aerosol solution inhaler; Inhale 2 puffs Daily.  Dispense: 12 g; Refill: 3           Return in about 5 months (around 5/23/2025) for Recheck, For Dr. Porter.    DISCUSSION (if any):  PFT 2019 consistent with no obstruction. However, not acceptable per ATS criteria.     I have discussed in detail that the patient's oxygen saturation today was less than 80% on room air at rest.  I warmed her hands for several minutes due to cold fingertips and her fingernails are abnormally long but the pulse oximetry did seem to change appropriately once oxygen was added.  I have discussed in detail with the patient and her  today that the patient needs to use oxygen at 2 L/min continuously due to her low oxygen level on room air at rest.    The patient has dementia and I am not sure that she understands everything that I say.  I asked her a couple of questions and she points to her  and tells me to ask him.    The patient's  states that the patient has been adamant she did not want to start oxygen.  I verbalized understanding and tell them that it is our recommendation but we obviously cannot make her use the oxygen.  The patient's  asks if we can get her a portable oxygen concentrator and I have told him that I will be more than happy to order the concentrator we will have to see if the insurance covers it and how  much they cover.  He verbalizes understanding.    I have told the patient's  that if the co-pay of the oxygen is too expensive that they can refuse the oxygen due to financial reasons.  He verbalizes understanding.    I have ordered oxygen through [facility].    No change to the current medications has been made.  Overall she seems to be doing well with Spiriva and I have asked that they continue this medication.    He does feel that she is having some nasal drainage and this may be causing her to have some cough.  They previously had a nasal spray but it has run out.  I have sent a prescription for Flonase.    Compliance with medications stressed.     Side effects of prescribed medications discussed with the patient.    The patient belongs to the risk group for which lung cancer screening has been recommended.     I discussed CT results. The CT is stable.     Study Result    Narrative & Impression   PROCEDURE: CT CHEST WO CONTRAST DIAGNOSTIC-     HISTORY: abnormal CT; R91.8-Other nonspecific abnormal finding of lung  field     COMPARISON: March 2019.     PROCEDURE:  Multiple axial CT images were obtained from the thoracic  inlet through the upper abdomen without the use of contrast.      FINDINGS:   Soft tissue windows reveal no axillary, hilar or mediastinal adenopathy.  Heart size is normal. The aorta is normal in caliber. There are no  pleural or pericardial effusions. There are vascular calcifications.  There is evidence of prior granulomatous disease. There is volume loss  on the left that appears similar to prior. There is significant  pleural-parenchymal scarring in the left apex particularly medially.  There is bronchiectasis as seen previously in the left upper lobe. There  is mild pleural thickening laterally in the left lower hemithorax. There  are mild emphysematous changes. The visualized upper abdomen is  unremarkable. Bone windows reveal no acute osseous abnormalities.      IMPRESSION:  Stable chronic findings from 2019.              This study was performed with techniques to keep radiation doses as low  as reasonably achievable (ALARA). Individualized dose reduction  techniques using automated exposure control or adjustment of mA and/or  kV according to the patient size were employed.          Echocardiogram Findings    Left Ventricle Left ventricular systolic function is normal. Calculated EF = 59.0%. Estimated EF = 60%. Normal left ventricular cavity size and wall thickness noted. All left ventricular wall segments contract normally. Left ventricular diastolic dysfunction is noted (grade I) consistent with impaired relaxation.   Right Ventricle Normal right ventricular cavity size, wall thickness, systolic function and septal motion noted.   Left Atrium Normal left atrial size and volume noted.   Right Atrium Normal right atrial size noted.   Aortic Valve The aortic valve is abnormal in structure. There is thickening  of the aortic valve. No aortic valve regurgitation is present. No aortic valve stenosis is present.   Mitral Valve The mitral valve is normal in structure. Trace mitral valve regurgitation is present. No significant mitral valve stenosis is present.   Tricuspid Valve The tricuspid valve is normal.  No tricuspid valve regurgitation is present.   Pulmonic Valve The pulmonic valve is structurally normal. There is no significant pulmonic valve stenosis present. There is no pulmonic valve regurgitation present.   Greater Vessels No dilation of the aortic root is present. No dilation of the sinuses of Valsalva is present.   Pericardium There is evidence of a fat pad present.       Dictated utilizing Dragon dictation.    This document was electronically signed by ALIZE Vale December 23, 2024  14:14 EST

## 2025-05-28 ENCOUNTER — OFFICE VISIT (OUTPATIENT)
Dept: PULMONOLOGY | Facility: CLINIC | Age: 85
End: 2025-05-28
Payer: MEDICARE

## 2025-05-28 VITALS
HEART RATE: 73 BPM | OXYGEN SATURATION: 97 % | DIASTOLIC BLOOD PRESSURE: 62 MMHG | HEIGHT: 64 IN | WEIGHT: 134 LBS | BODY MASS INDEX: 22.88 KG/M2 | RESPIRATION RATE: 18 BRPM | SYSTOLIC BLOOD PRESSURE: 118 MMHG

## 2025-05-28 DIAGNOSIS — J47.9 BRONCHIECTASIS WITHOUT ACUTE EXACERBATION: ICD-10-CM

## 2025-05-28 DIAGNOSIS — G47.34 NOCTURNAL HYPOXIA: ICD-10-CM

## 2025-05-28 DIAGNOSIS — J30.9 ALLERGIC RHINITIS, UNSPECIFIED SEASONALITY, UNSPECIFIED TRIGGER: Primary | ICD-10-CM

## 2025-05-28 PROCEDURE — 99214 OFFICE O/P EST MOD 30 MIN: CPT | Performed by: INTERNAL MEDICINE

## 2025-05-28 PROCEDURE — 3078F DIAST BP <80 MM HG: CPT | Performed by: INTERNAL MEDICINE

## 2025-05-28 PROCEDURE — 3074F SYST BP LT 130 MM HG: CPT | Performed by: INTERNAL MEDICINE

## 2025-05-28 RX ORDER — FLUTICASONE PROPIONATE 50 MCG
1 SPRAY, SUSPENSION (ML) NASAL DAILY
Qty: 54 G | Refills: 3 | Status: SHIPPED | OUTPATIENT
Start: 2025-05-28

## 2025-05-28 RX ORDER — TIOTROPIUM BROMIDE INHALATION SPRAY 3.12 UG/1
2 SPRAY, METERED RESPIRATORY (INHALATION)
Qty: 12 G | Refills: 3 | Status: SHIPPED | OUTPATIENT
Start: 2025-05-28

## 2025-05-28 NOTE — PROGRESS NOTES
"  Chief Complaint   Patient presents with    Breathing Problem    Follow-up         Subjective   Teena Coe is a 85 y.o. female.   Patient was evaluated today for follow up of shortness of breath, allergic rhinitis and bronchiectasis/COPD.     Patient says that her symptoms have been stable since the last clinic visit. she reports 1 recent round of antibiotics after her  had pneumonia a few days prior.    Patient is using Spiriva, as prescribed. Exercise tolerance has also remained stable but limited due to multiple issues.     Quit smoking >20 years ago.     Continues to have postural drainage performed by her .     Patient says that she has been using her nasal sprays on a regular basis and describes no significant ongoing issues other than occasional congestion.     The patient says that she is using oxygen as prescribed and feels that it appears to be helping some of her symptoms.    The following portions of the patient's history were reviewed and updated as appropriate: allergies, current medications, past family history, past medical history, past social history, and past surgical history.    Review of Systems   HENT:  Negative for sinus pressure, sneezing and sore throat.    Respiratory:  Positive for wheezing. Negative for cough, chest tightness and shortness of breath.        Objective   Visit Vitals  /62   Pulse 73   Resp 18   Ht 162.6 cm (64\") Comment: pt reported   Wt 60.8 kg (134 lb)   SpO2 97%   BMI 23.00 kg/m²       BMI Readings from Last 8 Encounters:   05/28/25 23.00 kg/m²   12/23/24 22.14 kg/m²   08/19/24 22.49 kg/m²   05/16/24 24.03 kg/m²   05/05/23 26.57 kg/m²   11/18/22 26.52 kg/m²   10/11/22 27.79 kg/m²   05/03/22 26.70 kg/m²       Physical Exam  Vitals reviewed.   Constitutional:       Appearance: She is well-developed.   HENT:      Head: Normocephalic and atraumatic.   Eyes:      Extraocular Movements: Extraocular movements intact.   Cardiovascular:      Rate and " Rhythm: Normal rate.   Pulmonary:      Comments: Somewhat hyperresonant to percussion.  Somewhat decreased air entry.  No obvious wheezing noted.   Musculoskeletal:      Cervical back: Neck supple.   Neurological:      Mental Status: She is alert.           Assessment & Plan   Diagnoses and all orders for this visit:    1. Allergic rhinitis, unspecified seasonality, unspecified trigger (Primary)    2. Bronchiectasis without acute exacerbation    3. Nocturnal hypoxia    Other orders  -     fluticasone (FLONASE) 50 MCG/ACT nasal spray; Administer 1 spray into the nostril(s) as directed by provider Daily. Administer 1 spray in each nostril for each dose.  Dispense: 54 g; Refill: 3  -     tiotropium bromide monohydrate (Spiriva Respimat) 2.5 MCG/ACT aerosol solution inhaler; Inhale 2 puffs Daily.  Dispense: 12 g; Refill: 3           Return in about 43 weeks (around 3/25/2026) for Recheck, For Radha Ramos).    DISCUSSION (if any):  Last CT scan results was reviewed in great detail with the patient.  Results for orders placed during the hospital encounter of 08/19/24    CT Chest Without Contrast Diagnostic    Narrative  PROCEDURE: CT CHEST WO CONTRAST DIAGNOSTIC-    HISTORY: abnormal CT; R91.8-Other nonspecific abnormal finding of lung  field    COMPARISON: March 2019.    PROCEDURE:  Multiple axial CT images were obtained from the thoracic  inlet through the upper abdomen without the use of contrast.    FINDINGS:  Soft tissue windows reveal no axillary, hilar or mediastinal adenopathy.  Heart size is normal. The aorta is normal in caliber. There are no  pleural or pericardial effusions. There are vascular calcifications.  There is evidence of prior granulomatous disease. There is volume loss  on the left that appears similar to prior. There is significant  pleural-parenchymal scarring in the left apex particularly medially.  There is bronchiectasis as seen previously in the left upper lobe. There  is mild pleural  thickening laterally in the left lower hemithorax. There  are mild emphysematous changes. The visualized upper abdomen is  unremarkable. Bone windows reveal no acute osseous abnormalities.    Impression  Stable chronic findings from 2019.          This study was performed with techniques to keep radiation doses as low  as reasonably achievable (ALARA). Individualized dose reduction  techniques using automated exposure control or adjustment of mA and/or  kV according to the patient size were employed.          Images were reviewed, interpreted, and dictated by Dr. Cha Pack MD  Transcribed by Kimberley Carmona PA-C.    This report was signed and finalized on 8/19/2024 1:55 PM by Cha Pack MD.    Latest available PFTs were reviewed.  Consistent with mild restriction    We have reviewed her pulmonary medications in great detail.    Compliance with medications stressed.     Side effects of prescribed medications discussed with the patient    Continue postural drainage.    Patient was advised to continue her nasal spray, especially given improvement in symptoms overall.    The patient was advised to continue using oxygen at night.    Vaccination status addressed.    Up-to-date with influenza vaccinations.     Up-to-date with pneumonia vaccinations.           Dictated utilizing Dragon dictation.    This document was electronically signed by Ayan Porter MD on 05/28/25 at 10:52 EDT

## (undated) DEVICE — HP CONCL INTREPID COAX I/A CRV .3MM

## (undated) DEVICE — Device

## (undated) DEVICE — GLV SURG SENSICARE W/ALOE PF LF 8 STRL

## (undated) DEVICE — SYR LL TP 10ML STRL

## (undated) DEVICE — RUBBERBAND LF STRL PK/2

## (undated) DEVICE — 15 DEG. MICROKNIFE - 3MM: Brand: SHARPOINT

## (undated) DEVICE — SOL IRRIG H2O 1000ML STRL

## (undated) DEVICE — CANN IRR/INJ AIR ANT CHAMBER 6MM BEND 27G

## (undated) DEVICE — SLIT KNIFE FULL HDL-2.85MM ANG: Brand: SHARPOINT

## (undated) DEVICE — GOWN,PREVENTION PLUS,XLARGE,STERILE: Brand: MEDLINE